# Patient Record
Sex: FEMALE | Race: WHITE | NOT HISPANIC OR LATINO | Employment: OTHER | URBAN - NONMETROPOLITAN AREA
[De-identification: names, ages, dates, MRNs, and addresses within clinical notes are randomized per-mention and may not be internally consistent; named-entity substitution may affect disease eponyms.]

---

## 2017-01-18 ENCOUNTER — TELEPHONE (OUTPATIENT)
Dept: CARDIOLOGY | Facility: CLINIC | Age: 77
End: 2017-01-18

## 2017-01-18 NOTE — TELEPHONE ENCOUNTER
Pt called stating that she feels like there is a vein close to the skin above the pacemaker.  She was concerned about it.  After speaking with the pt it sounds like a lead that she is feeling. Pt states nothing is out of the skin, she can just feel it under there.  I advised pt we could take a look at it if she is concerned and that we would need to see her ASAP if anything came through the skin there.   Advised that if it was very uncomfortable she should see her doctor.  Pt voiced understanding.

## 2017-01-23 RX ORDER — DIGOXIN 250 MCG
250 TABLET ORAL DAILY
Qty: 90 TABLET | Refills: 3 | Status: SHIPPED | OUTPATIENT
Start: 2017-01-23 | End: 2018-01-22 | Stop reason: SDUPTHER

## 2017-01-23 RX ORDER — OLMESARTAN MEDOXOMIL 40 MG/1
40 TABLET ORAL DAILY
Qty: 90 TABLET | Refills: 1 | Status: SHIPPED | OUTPATIENT
Start: 2017-01-23 | End: 2017-07-18 | Stop reason: SDUPTHER

## 2017-02-03 ENCOUNTER — CLINICAL SUPPORT (OUTPATIENT)
Dept: CARDIOLOGY | Facility: CLINIC | Age: 77
End: 2017-02-03

## 2017-02-03 DIAGNOSIS — Z95.810 AICD (AUTOMATIC CARDIOVERTER/DEFIBRILLATOR) PRESENT: Primary | ICD-10-CM

## 2017-02-03 DIAGNOSIS — I50.22 CHRONIC SYSTOLIC CONGESTIVE HEART FAILURE (HCC): ICD-10-CM

## 2017-02-03 DIAGNOSIS — I25.5 ISCHEMIC CARDIOMYOPATHY: ICD-10-CM

## 2017-02-03 PROCEDURE — 93297 REM INTERROG DEV EVAL ICPMS: CPT | Performed by: INTERNAL MEDICINE

## 2017-02-03 PROCEDURE — 93296 REM INTERROG EVL PM/IDS: CPT | Performed by: INTERNAL MEDICINE

## 2017-02-03 PROCEDURE — 93295 DEV INTERROG REMOTE 1/2/MLT: CPT | Performed by: INTERNAL MEDICINE

## 2017-02-03 NOTE — PROGRESS NOTES
Dual Chamber AICD Evaluation Report  [facility]    February 3, 2017    Primary Cardiologist: Hiram  : Medtronic Model: Evera  Implant date: May 28, 2015    Reason for evaluation:routine  Indication for AICD: ischemic cardiomyopathy, chronic systolic CHF    Measurements  Atrial sensing - P wave: 0.3 mV  Atrial threshold: n/a  Atrial lead impedance: 456 ohms  Ventricular sensing - R wave: 4.1 mV  Ventricular threshold: 1.125 V @ 0.4 ms  Ventricular lead impedance:  760 ohms  Shock impedance:  RV- 44 ohms   SVC- 58 ohms      Diagnostic Data  Atrial paced: n/a   Ventricular paced: 33.6 %  Other: chronic a-fib   Pt can not take anticoagulants per previous OV   No therapy delivered   Optivol WNL  Battery status: satisfactory  Est 7.9 years      Final Parameters  Mode: VVIR  Lower rate: 60 bpm   Atrial - Amplitude: n/a Sensitivity: 0.15 mV   Ventricular - Amplitude: 2.5 V Pulse width: 0.4 ms Sensitivity: 0.3 mV   Changes made: n/a  Conclusions: normal AICD function, no therapy delivered and stable pacing and sensing thresholds    Follow up: 3 months

## 2017-02-20 NOTE — TELEPHONE ENCOUNTER
Pt needs refills of crestor sent to [facility] for a 90 day supply.  She has appt in June. Kevyn Ralph, CMA

## 2017-02-22 RX ORDER — ROSUVASTATIN CALCIUM 10 MG/1
10 TABLET, FILM COATED ORAL DAILY
Qty: 90 TABLET | Refills: 3 | Status: SHIPPED | OUTPATIENT
Start: 2017-02-22 | End: 2018-04-26

## 2017-04-07 ENCOUNTER — OFFICE VISIT (OUTPATIENT)
Dept: CARDIOLOGY | Facility: CLINIC | Age: 77
End: 2017-04-07

## 2017-04-07 VITALS
SYSTOLIC BLOOD PRESSURE: 168 MMHG | DIASTOLIC BLOOD PRESSURE: 86 MMHG | WEIGHT: 225 LBS | HEIGHT: 63 IN | HEART RATE: 94 BPM | BODY MASS INDEX: 39.87 KG/M2

## 2017-04-07 DIAGNOSIS — I25.709 CORONARY ARTERY DISEASE INVOLVING CORONARY BYPASS GRAFT OF NATIVE HEART WITH ANGINA PECTORIS (HCC): ICD-10-CM

## 2017-04-07 DIAGNOSIS — I48.20 CHRONIC ATRIAL FIBRILLATION (HCC): ICD-10-CM

## 2017-04-07 DIAGNOSIS — I50.22 CHRONIC SYSTOLIC CONGESTIVE HEART FAILURE (HCC): Primary | ICD-10-CM

## 2017-04-07 DIAGNOSIS — E78.2 MIXED HYPERLIPIDEMIA: ICD-10-CM

## 2017-04-07 PROCEDURE — 93000 ELECTROCARDIOGRAM COMPLETE: CPT | Performed by: INTERNAL MEDICINE

## 2017-04-07 PROCEDURE — 99215 OFFICE O/P EST HI 40 MIN: CPT | Performed by: INTERNAL MEDICINE

## 2017-04-07 RX ORDER — METOPROLOL SUCCINATE 25 MG/1
25 TABLET, EXTENDED RELEASE ORAL DAILY
COMMUNITY
End: 2018-04-26 | Stop reason: ALTCHOICE

## 2017-04-07 RX ORDER — GUAIFENESIN 600 MG/1
1200 TABLET, EXTENDED RELEASE ORAL DAILY
COMMUNITY

## 2017-04-07 RX ORDER — FUROSEMIDE 40 MG/1
40 TABLET ORAL 2 TIMES DAILY
COMMUNITY

## 2017-04-07 RX ORDER — PROBENECID AND COLCHICINE 500; .5 MG/1; MG/1
1 TABLET ORAL 2 TIMES DAILY
COMMUNITY
End: 2018-09-11 | Stop reason: ALTCHOICE

## 2017-04-07 RX ORDER — SIMETHICONE 125 MG
125 TABLET,CHEWABLE ORAL EVERY 6 HOURS PRN
COMMUNITY
End: 2018-04-26 | Stop reason: ALTCHOICE

## 2017-04-07 NOTE — PROGRESS NOTES
Referring Provider: Nathen Garza MD    Reason for Follow-up Visit: CAD    Subjective .   Chief Complaint:   Chief Complaint   Patient presents with   • Follow-up     having chest pain and palpitations   • Chest Pain     pressure and tightness in chest.  feels like its coming from difibrillator   • Atrial Fibrillation     feeling heart skipping, racing and fluttering.   • Pacemaker Check     feels like it moves around and pokes out       History of present illness:  Cary Shah is a 76 y.o. yo female with history of CAD/CHF, s/p AICD. Complains of neck and should pain. Not particulary associated with exertion.        History  Past Medical History:   Diagnosis Date   • AICD present, double chamber    • Arteriosclerosis of coronary artery bypass graft     stents and CABG   • Atherosclerosis of native coronary artery with angina pectoris    • Back pain, chronic    • Cardiac device in situ     Cardiac device in situ (OPTIVOL REMOTE)   • Cardiac device in situ     Cardiac device in situ, other   • Chest pain    • Chronic atrial fibrillation    • Chronic kidney disease due to diabetes mellitus    • Chronic systolic heart failure     EF 45-50% 11/2014 echo   • Diabetes mellitus due to underlying condition with complications    • Diabetes mellitus type 2, uncontrolled, without complications    • H/O acute myocardial infarction    • History of coronary artery bypass graft     Coronary Artery Bypass, Two   • Hyperlipidemia    • Hypothyroidism    • Ischemic cardiomyopathy    • Malignant hypertension     better control   • Myocardial infarction    • Non-compliance    • Shortness of breath    • Shoulder pain, left    • Sleep apnea     not officially diagnosed   • Snoring    • Ventricular fibrillation    ,   Past Surgical History:   Procedure Laterality Date   • CARDIAC CATHETERIZATION     • CARDIAC DEFIBRILLATOR PLACEMENT     • CATARACT EXTRACTION     • CORONARY ARTERY BYPASS GRAFT     • CORONARY STENT PLACEMENT     •  INSERT / REPLACE / REMOVE PACEMAKER     • OTHER SURGICAL HISTORY      sweat gland removed   • OTHER SURGICAL HISTORY      reconstructive surgery after a spider bite   • TOTAL THYROIDECTOMY     • TUBAL ABDOMINAL LIGATION     ,   Family History   Problem Relation Age of Onset   • Coronary artery disease Mother    • Heart attack Mother    • Coronary artery disease Father    • Heart attack Father    • Heart disease Sister    • Heart attack Sister    • Coronary artery disease Sister    • Heart attack Brother    • Heart disease Brother    • Coronary artery disease Brother    • No Known Problems Maternal Grandmother    • No Known Problems Maternal Grandfather    • No Known Problems Paternal Grandmother    • No Known Problems Paternal Grandfather    • Heart disease Brother    • Heart disease Brother    • Heart attack Brother    ,   Social History   Substance Use Topics   • Smoking status: Current Some Day Smoker     Packs/day: 0.25     Types: Cigarettes     Start date: 1960   • Smokeless tobacco: Never Used      Comment: has started smoking again   • Alcohol use No   ,     Medications  Current Outpatient Prescriptions   Medication Sig Dispense Refill   • aspirin 81 MG EC tablet Take 81 mg by mouth Daily.     • bismuth subsalicylate (PEPTO BISMOL) 262 MG/15ML suspension Take 15 mL by mouth Every 6 (Six) Hours As Needed for indigestion.     • Camphor-Eucalyptus-Menthol (VICKS VAPORUB EX) Apply  topically.     • colchicine-probenecid (COL-BENEMID) 0.5-500 MG tablet Take 1 tablet by mouth Daily.     • CRESTOR 10 MG tablet Take 1 tablet by mouth Daily. 90 tablet 3   • digoxin (LANOXIN) 250 MCG tablet Take 1 tablet by mouth Daily. 90 tablet 3   • felodipine (PLENDIL) 5 MG 24 hr tablet Take 5 mg by mouth Daily.     • furosemide (LASIX) 40 MG tablet Take 40 mg by mouth 2 (Two) Times a Day.     • guaiFENesin (MUCINEX) 600 MG 12 hr tablet Take 1,200 mg by mouth 2 (Two) Times a Day.     • HYDROcodone-acetaminophen (NORCO) 7.5-325 MG  "per tablet Take 1 tablet by mouth Every 6 (Six) Hours As Needed for Moderate Pain (4-6).     • insulin aspart (novoLOG) 100 UNIT/ML injection Inject  under the skin 3 (Three) Times a Day Before Meals.     • insulin glargine (LANTUS) 100 UNIT/ML injection Inject  under the skin Daily.     • levothyroxine (SYNTHROID, LEVOTHROID) 175 MCG tablet Take 175 mcg by mouth Daily.     • olmesartan (BENICAR) 40 MG tablet Take 1 tablet by mouth Daily. 90 tablet 1   • pantoprazole (PROTONIX) 40 MG EC tablet Take 40 mg by mouth Daily.     • potassium chloride (K-DUR,KLOR-CON) 20 MEQ CR tablet Take 20 mEq by mouth 2 (Two) Times a Day.     • simethicone (MYLICON) 125 MG chewable tablet Chew 125 mg Every 6 (Six) Hours As Needed for flatulence.     • vitamin E 400 UNIT capsule Take 400 Units by mouth Daily.     • carvedilol (COREG) 3.125 MG tablet Take 3.125 mg by mouth 2 (Two) Times a Day With Meals.     • methocarbamol (ROBAXIN) 500 MG tablet Take 500 mg by mouth 4 (Four) Times a Day.     • metoprolol succinate XL (TOPROL-XL) 25 MG 24 hr tablet Take 25 mg by mouth Daily.       No current facility-administered medications for this visit.        Allergies:  Levaquin [levofloxacin]    Review of Systems  Review of Systems   HENT: Negative for nosebleeds.    Cardiovascular: Positive for chest pain and dyspnea on exertion. Negative for claudication, irregular heartbeat, leg swelling, near-syncope, orthopnea, palpitations, paroxysmal nocturnal dyspnea and syncope.   Respiratory: Negative for cough, hemoptysis and shortness of breath.    Musculoskeletal: Positive for joint pain and neck pain.   Gastrointestinal: Negative for dysphagia, hematemesis and melena.   Genitourinary: Negative for hematuria.       Objective     Physical Exam:  /86 (BP Location: Left arm, Patient Position: Sitting, Cuff Size: Large Adult)  Pulse 94  Ht 63\" (160 cm)  Wt 225 lb (102 kg)  BMI 39.86 kg/m2  Physical Exam   Constitutional: She is oriented to " person, place, and time. She appears well-developed and well-nourished. No distress.   HENT:   Head: Normocephalic and atraumatic.   Eyes: No scleral icterus.   Neck: Normal range of motion.   Cardiovascular: Normal rate, regular rhythm and normal heart sounds.  Exam reveals no gallop and no friction rub.    No murmur heard.  Pulmonary/Chest: Effort normal and breath sounds normal. No respiratory distress. She has no wheezes. She has no rales.   Abdominal: Soft. Bowel sounds are normal. She exhibits no distension. There is no tenderness.   Musculoskeletal: She exhibits no edema.   Neurological: She is alert and oriented to person, place, and time. No cranial nerve deficit.   Skin: Skin is warm and dry. She is not diaphoretic. No erythema.   Psychiatric: She has a normal mood and affect. Her behavior is normal.       Results Review:    ECG 12 Lead  Date/Time: 4/7/2017 10:02 AM  Performed by: LUDWIN FITZPATRICK  Authorized by: LUDWIN FITZPATRICK   Comparison: compared with previous ECG   Similar to previous ECG  Rhythm: atrial fibrillation  Ectopy: infrequent PVCs  Rate: normal  ST Segments: ST segments normal  T depression: all  QRS axis: normal  Clinical impression: abnormal ECG  Comments: Occasional pacing             Hospital Outpatient Visit on 05/20/2015   Component Date Value Ref Range Status   • WBC 05/20/2015 5.77  4.80 - 10.80 K/mcL Final   • RBC 05/20/2015 4.44  4.20 - 5.40 M/mcL Final   • Hemoglobin 05/20/2015 13.6  12.0 - 16.0 g/dL Final   • Hematocrit 05/20/2015 40.2  37.0 - 47.0 % Final   • MCV 05/20/2015 90.5  82.0 - 98.0 fL Final   • MCH 05/20/2015 30.6  28.0 - 32.0 pg Final   • MCHC 05/20/2015 33.8  33.0 - 36.0 gm/dL Final   • RDW-SD 05/20/2015 41.7  40.0 - 54.0 fL Final   • RDW-CV 05/20/2015 12.5  12.0 - 15.0 % Final   • RDW 05/20/2015 12.5  12 - 15 % Final   • Platelets 05/20/2015 175  130 - 400 K/mcL Final   • Sodium 05/20/2015 142  135 - 145 mmol/L Final   • Potassium 05/20/2015 4.1  3.5 - 5.3 mmol/L Final    • Chloride 05/20/2015 101  98 - 110 mmol/L Final   • CO2 05/20/2015 28  24 - 31 mmol/L Final   • Glucose 05/20/2015 109* 70 - 100 mg/dL Final   • BUN 05/20/2015 27* 5 - 21 mg/dL Final   • Creatinine 05/20/2015 1.02  0.5 - 1.4 mg/dL Final   • Calcium 05/20/2015 9.7  8.4 - 10.4 mg/dL Final   • Total Protein 05/20/2015 7.6  6.3 - 8.7 g/dL Final   • Albumin 05/20/2015 4.5  3.5 - 5.0 g/dL Final   • Total Bilirubin 05/20/2015 0.6  0.1 - 1.0 mg/dL Final   • Alkaline Phosphatase 05/20/2015 80  24 - 120 Units/L Final   • AST (SGOT) 05/20/2015 29  7 - 45 Units/L Final   • ALT (SGPT) 05/20/2015 31  0 - 54 Units/L Final   • Anion Gap 05/20/2015 14* 4 - 13 mmol/L Final   • eGFR 05/20/2015 53  ml/min/1.732 Final    Comment: DF by IF @ 05/20/2015 19:33  GFR Normal                            >60  Chronic Kidney Disease          <60  Kidney Failure                         <15  US by IF @ 05/20/2015 19:33  The MDRD GFR formula is only valid for adults with stable renal function between ages 18 and 70.     • Total Cholesterol 05/20/2015 223* 130 - 200 mg/dL Final   • HDL Cholesterol 05/20/2015 32  mg/dL Final    Comment: DF by IF @ 05/20/2015 19:33  HDL Reference Range  Female: >50  Male: >40     • Triglycerides 05/20/2015 398* 0 - 149 mg/dL Final   • VLDL Cholesterol 05/20/2015 80* 6 - 32 mg/dL Final   • LDL Cholesterol  05/20/2015 115* 0 - 99 mg/dL Final   • LDL/HDL Ratio 05/20/2015 3.6* 0.0 - 3.4 Final   • Fasting? 05/20/2015 y   Final       Assessment/Plan   Cary was seen today for follow-up, chest pain, atrial fibrillation and pacemaker check.    Diagnoses and all orders for this visit:    Chronic systolic congestive heart failure, Currently doing well with compensated chronic systolic congestive heart failure    Coronary artery disease involving coronary bypass graft of native heart with angina pectoris, pain is very atypical. Has not had a stress test since 2014. Will schedule    Mixed hyperlipidemia Patient's statin  therapy is followed by PCP.    Chronic atrial fibrillation, chronic, anticoagulated, rate controlled  Hypertension, running high. Agree with restarting her beta blocker    Other orders  -     ECG 12 Lead

## 2017-04-10 ENCOUNTER — HOSPITAL ENCOUNTER (OUTPATIENT)
Dept: CARDIOLOGY | Facility: HOSPITAL | Age: 77
Discharge: HOME | End: 2017-04-10
Attending: INTERNAL MEDICINE | Admitting: INTERNAL MEDICINE

## 2017-04-10 VITALS — SYSTOLIC BLOOD PRESSURE: 147 MMHG | HEART RATE: 80 BPM | DIASTOLIC BLOOD PRESSURE: 70 MMHG

## 2017-04-10 DIAGNOSIS — I50.22 CHRONIC SYSTOLIC CONGESTIVE HEART FAILURE (HCC): ICD-10-CM

## 2017-04-10 PROCEDURE — C8928 TTE W OR W/O FOL W/CON,STRES: HCPCS

## 2017-04-10 PROCEDURE — 93352 ADMIN ECG CONTRAST AGENT: CPT | Performed by: INTERNAL MEDICINE

## 2017-04-10 PROCEDURE — 93018 CV STRESS TEST I&R ONLY: CPT | Performed by: INTERNAL MEDICINE

## 2017-04-10 PROCEDURE — 93350 STRESS TTE ONLY: CPT | Performed by: INTERNAL MEDICINE

## 2017-04-10 PROCEDURE — 25010000003 DOBUTAMINE PER 250 MG: Performed by: INTERNAL MEDICINE

## 2017-04-10 PROCEDURE — 93017 CV STRESS TEST TRACING ONLY: CPT

## 2017-04-10 PROCEDURE — 25010000002 PERFLUTREN (DEFINITY) 8.476 MG IN SODIUM CHLORIDE 10 ML INJECTION: Performed by: INTERNAL MEDICINE

## 2017-04-10 RX ORDER — DOBUTAMINE HYDROCHLORIDE 100 MG/100ML
10-50 INJECTION INTRAVENOUS
Status: DISCONTINUED | OUTPATIENT
Start: 2017-04-10 | End: 2017-04-11 | Stop reason: HOSPADM

## 2017-04-10 RX ADMIN — DOBUTAMINE HYDROCHLORIDE 10 MCG/KG/MIN: 100 INJECTION INTRAVENOUS at 15:20

## 2017-04-10 RX ADMIN — SODIUM CHLORIDE 5 ML: 9 INJECTION INTRAMUSCULAR; INTRAVENOUS; SUBCUTANEOUS at 15:07

## 2017-04-10 RX ADMIN — SODIUM CHLORIDE 5 ML: 9 INJECTION INTRAMUSCULAR; INTRAVENOUS; SUBCUTANEOUS at 15:24

## 2017-04-11 LAB
BH CV ECHO MEAS - BSA(HAYCOCK): 2.2 M^2
BH CV ECHO MEAS - BSA: 2 M^2
BH CV ECHO MEAS - BZI_BMI: 39.9 KILOGRAMS/M^2
BH CV ECHO MEAS - BZI_METRIC_HEIGHT: 160 CM
BH CV ECHO MEAS - BZI_METRIC_WEIGHT: 102.1 KG
BH CV STRESS BP STAGE 1: NORMAL
BH CV STRESS BP STAGE 3: NORMAL
BH CV STRESS DOSE DOBUTAMINE STAGE 1: 10
BH CV STRESS DOSE DOBUTAMINE STAGE 2: 20
BH CV STRESS DOSE DOBUTAMINE STAGE 3: 30
BH CV STRESS DURATION MIN STAGE 1: 3
BH CV STRESS DURATION MIN STAGE 2: 3
BH CV STRESS DURATION MIN STAGE 3: 2
BH CV STRESS DURATION SEC STAGE 1: 0
BH CV STRESS DURATION SEC STAGE 2: 0
BH CV STRESS DURATION SEC STAGE 3: 13
BH CV STRESS HR STAGE 1: 105
BH CV STRESS HR STAGE 2: 96
BH CV STRESS HR STAGE 3: 135
BH CV STRESS PROTOCOL 1: NORMAL
BH CV STRESS RECOVERY BP: NORMAL MMHG
BH CV STRESS RECOVERY HR: 73 BPM
BH CV STRESS STAGE 1: 1
BH CV STRESS STAGE 2: 2
BH CV STRESS STAGE 3: 3
MAXIMAL PREDICTED HEART RATE: 145 BPM
PERCENT MAX PREDICTED HR: 93.1 %
STRESS BASELINE BP: NORMAL MMHG
STRESS BASELINE HR: 80 BPM
STRESS PERCENT HR: 110 %
STRESS POST PEAK BP: NORMAL MMHG
STRESS POST PEAK HR: 135 BPM
STRESS TARGET HR: 123 BPM

## 2017-04-19 ENCOUNTER — TELEPHONE (OUTPATIENT)
Dept: CARDIOLOGY | Facility: CLINIC | Age: 77
End: 2017-04-19

## 2017-04-19 NOTE — TELEPHONE ENCOUNTER
----- Message from Gus Gandhi MD sent at 4/11/2017 12:15 PM CDT -----  Negative for ischemia  4/19/17 pt informed.  Mailed letter with results.  Kevyn Ralph, CMA

## 2017-05-05 ENCOUNTER — CLINICAL SUPPORT (OUTPATIENT)
Dept: CARDIOLOGY | Facility: CLINIC | Age: 77
End: 2017-05-05

## 2017-05-05 DIAGNOSIS — Z95.810 AICD (AUTOMATIC CARDIOVERTER/DEFIBRILLATOR) PRESENT: Primary | ICD-10-CM

## 2017-05-05 DIAGNOSIS — I50.22 CHRONIC SYSTOLIC CONGESTIVE HEART FAILURE (HCC): ICD-10-CM

## 2017-05-05 PROCEDURE — 93297 REM INTERROG DEV EVAL ICPMS: CPT | Performed by: INTERNAL MEDICINE

## 2017-05-05 PROCEDURE — 93296 REM INTERROG EVL PM/IDS: CPT | Performed by: INTERNAL MEDICINE

## 2017-05-05 PROCEDURE — 93295 DEV INTERROG REMOTE 1/2/MLT: CPT | Performed by: INTERNAL MEDICINE

## 2017-07-19 RX ORDER — OLMESARTAN MEDOXOMIL 40 MG/1
TABLET ORAL
Qty: 90 TABLET | Refills: 3 | Status: SHIPPED | OUTPATIENT
Start: 2017-07-19 | End: 2018-07-17 | Stop reason: SDUPTHER

## 2017-08-07 ENCOUNTER — CLINICAL SUPPORT (OUTPATIENT)
Dept: CARDIOLOGY | Facility: CLINIC | Age: 77
End: 2017-08-07

## 2017-08-07 DIAGNOSIS — I48.20 CHRONIC ATRIAL FIBRILLATION (HCC): ICD-10-CM

## 2017-08-07 DIAGNOSIS — I50.22 CHRONIC SYSTOLIC CONGESTIVE HEART FAILURE (HCC): ICD-10-CM

## 2017-08-07 DIAGNOSIS — Z95.810 AICD (AUTOMATIC CARDIOVERTER/DEFIBRILLATOR) PRESENT: Primary | ICD-10-CM

## 2017-08-07 PROCEDURE — 93295 DEV INTERROG REMOTE 1/2/MLT: CPT | Performed by: INTERNAL MEDICINE

## 2017-08-07 PROCEDURE — 93296 REM INTERROG EVL PM/IDS: CPT | Performed by: INTERNAL MEDICINE

## 2017-08-07 PROCEDURE — 93297 REM INTERROG DEV EVAL ICPMS: CPT | Performed by: INTERNAL MEDICINE

## 2017-11-10 ENCOUNTER — CLINICAL SUPPORT (OUTPATIENT)
Dept: CARDIOLOGY | Facility: CLINIC | Age: 77
End: 2017-11-10

## 2017-11-10 DIAGNOSIS — I50.22 CHRONIC SYSTOLIC CONGESTIVE HEART FAILURE (HCC): ICD-10-CM

## 2017-11-10 PROCEDURE — 93295 DEV INTERROG REMOTE 1/2/MLT: CPT | Performed by: PHYSICIAN ASSISTANT

## 2017-11-10 PROCEDURE — 93296 REM INTERROG EVL PM/IDS: CPT | Performed by: PHYSICIAN ASSISTANT

## 2017-11-13 NOTE — PROGRESS NOTES
Dual Chamber AICD Evaluation Report  [facility]    November 13, 2017    Primary Cardiologist: Hiram  : Medtronic Model: Evera  Implant date: 5/28/15    Reason for evaluation: routine  Indication for AICD: chronic systolic congestive heart failure and ischemic cardiomyopathy    Measurements  Atrial sensing - P wave: 0.1 mV  Atrial threshold: n/r  Atrial lead impedance: 399 ohms  Ventricular sensing - R wave: 4.6 mV  Ventricular threshold: 1.25 V @ 0.4 ms  Ventricular lead impedance:  722 ohms  Shock impedance:  RV- 42 ohms   SVC- 56 ohms      Diagnostic Data  Atrial paced: 0 %  Ventricular paced: 42 %  Other: Patient appears to be in chronic AF.  Rates controlled.  Patient is on ASA and cannot take anticoagulation as documented on previous device check notes.  Battery status: satisfactory        Final Parameters  Mode: VVIR  Lower rate: 60 bpm Upper rate: 130 bpm  Atrial - Amplitude: n/r Sensitivity: 0.15 mV   Ventricular - Amplitude: 2.5 V Pulse width: 0.4 ms Sensitivity: 0.3 mV   Changes made: n/a  Conclusions: normal AICD function    Follow up: 3 months

## 2018-01-24 RX ORDER — DIGOXIN 250 MCG
250 TABLET ORAL DAILY
Qty: 90 TABLET | Refills: 3 | Status: SHIPPED | OUTPATIENT
Start: 2018-01-24 | End: 2018-04-26 | Stop reason: SDUPTHER

## 2018-01-24 RX ORDER — DIGOXIN 250 UG/1
TABLET ORAL
Qty: 90 TABLET | Refills: 0 | Status: SHIPPED | OUTPATIENT
Start: 2018-01-24 | End: 2019-01-15 | Stop reason: SDUPTHER

## 2018-02-12 ENCOUNTER — CLINICAL SUPPORT NO REQUIREMENTS (OUTPATIENT)
Dept: CARDIOLOGY | Facility: CLINIC | Age: 78
End: 2018-02-12

## 2018-02-12 DIAGNOSIS — I50.22 CHRONIC SYSTOLIC CONGESTIVE HEART FAILURE (HCC): ICD-10-CM

## 2018-02-12 PROCEDURE — 93296 REM INTERROG EVL PM/IDS: CPT | Performed by: PHYSICIAN ASSISTANT

## 2018-02-12 PROCEDURE — 93295 DEV INTERROG REMOTE 1/2/MLT: CPT | Performed by: PHYSICIAN ASSISTANT

## 2018-02-14 NOTE — PROGRESS NOTES
Dual Chamber AICD Evaluation Report  [facility]    February 14, 2018    Primary Cardiologist: Hiram  : Medtronic Model: Evera  Implant date: 5/28/15    Reason for evaluation: routine  Indication for AICD: chronic systolic heart failure and ischemic cardiomyopathy    Measurements  Atrial sensing - P wave: 0.1 mV  Atrial threshold: n/r  Atrial lead impedance: 456 ohms  Ventricular sensing - R wave: 4.9 mV  Ventricular threshold: 1.125 V @ 0.4 ms  Ventricular lead impedance:  817 ohms  Shock impedance:  RV- 45 ohms   SVC- 64 ohms      Diagnostic Data  Atrial paced: 34.6 %  Ventricular paced: 40.6 %  Other: 100% AF; Cannot take anticoagulant  Battery status: satisfactory        Final Parameters  Mode: VVIR  Lower rate: 60 bpm Upper rate: 130 bpm     Atrial - Sensitivity: 0.15 mV   Ventricular - Amplitude: 2.25 V Pulse width: 0.4 ms Sensitivity: 0.3 mV   Changes made: n/a  Conclusions: normal AICD function    Follow up: 3 months

## 2018-04-26 ENCOUNTER — OFFICE VISIT (OUTPATIENT)
Dept: CARDIOLOGY | Facility: CLINIC | Age: 78
End: 2018-04-26

## 2018-04-26 VITALS
HEART RATE: 94 BPM | RESPIRATION RATE: 18 BRPM | SYSTOLIC BLOOD PRESSURE: 152 MMHG | WEIGHT: 230 LBS | BODY MASS INDEX: 40.75 KG/M2 | DIASTOLIC BLOOD PRESSURE: 85 MMHG | HEIGHT: 63 IN

## 2018-04-26 DIAGNOSIS — Z87.42 HISTORY OF VAGINAL BLEEDING: ICD-10-CM

## 2018-04-26 DIAGNOSIS — Z95.5 STATUS POST CORONARY ARTERY STENT PLACEMENT: ICD-10-CM

## 2018-04-26 DIAGNOSIS — I25.709 CORONARY ARTERY DISEASE INVOLVING CORONARY BYPASS GRAFT OF NATIVE HEART WITH ANGINA PECTORIS (HCC): Primary | ICD-10-CM

## 2018-04-26 DIAGNOSIS — I50.22 CHRONIC SYSTOLIC CONGESTIVE HEART FAILURE (HCC): ICD-10-CM

## 2018-04-26 DIAGNOSIS — I10 ESSENTIAL HYPERTENSION: ICD-10-CM

## 2018-04-26 DIAGNOSIS — IMO0001 CLASS 3 OBESITY DUE TO EXCESS CALORIES WITH SERIOUS COMORBIDITY AND BODY MASS INDEX (BMI) OF 40.0 TO 44.9 IN ADULT: ICD-10-CM

## 2018-04-26 DIAGNOSIS — E78.2 MIXED HYPERLIPIDEMIA: ICD-10-CM

## 2018-04-26 DIAGNOSIS — Z79.4 TYPE 2 DIABETES MELLITUS WITH COMPLICATION, WITH LONG-TERM CURRENT USE OF INSULIN (HCC): ICD-10-CM

## 2018-04-26 DIAGNOSIS — Z95.1 S/P CABG X 2: ICD-10-CM

## 2018-04-26 DIAGNOSIS — E11.8 TYPE 2 DIABETES MELLITUS WITH COMPLICATION, WITH LONG-TERM CURRENT USE OF INSULIN (HCC): ICD-10-CM

## 2018-04-26 DIAGNOSIS — I48.20 CHRONIC ATRIAL FIBRILLATION (HCC): ICD-10-CM

## 2018-04-26 PROCEDURE — 93000 ELECTROCARDIOGRAM COMPLETE: CPT | Performed by: NURSE PRACTITIONER

## 2018-04-26 PROCEDURE — 99214 OFFICE O/P EST MOD 30 MIN: CPT | Performed by: NURSE PRACTITIONER

## 2018-04-26 RX ORDER — METOPROLOL SUCCINATE 25 MG/1
25 TABLET, EXTENDED RELEASE ORAL DAILY
Qty: 90 TABLET | Refills: 3 | Status: SHIPPED | OUTPATIENT
Start: 2018-04-26 | End: 2019-04-15 | Stop reason: SDUPTHER

## 2018-04-26 RX ORDER — SPIRONOLACTONE 25 MG/1
25 TABLET ORAL DAILY
Qty: 90 TABLET | Refills: 3 | Status: SHIPPED | OUTPATIENT
Start: 2018-04-26 | End: 2019-04-15 | Stop reason: SDUPTHER

## 2018-04-26 RX ORDER — ASPIRIN 325 MG
325 TABLET, DELAYED RELEASE (ENTERIC COATED) ORAL DAILY
COMMUNITY

## 2018-04-26 NOTE — PATIENT INSTRUCTIONS
Obesity, Adult  Obesity is the condition of having too much total body fat. Being overweight or obese means that your weight is greater than what is considered healthy for your body size. Obesity is determined by a measurement called BMI. BMI is an estimate of body fat and is calculated from height and weight. For adults, a BMI of 30 or higher is considered obese.  Obesity can eventually lead to other health concerns and major illnesses, including:  · Stroke.  · Coronary artery disease (CAD).  · Type 2 diabetes.  · Some types of cancer, including cancers of the colon, breast, uterus, and gallbladder.  · Osteoarthritis.  · High blood pressure (hypertension).  · High cholesterol.  · Sleep apnea.  · Gallbladder stones.  · Infertility problems.  What are the causes?  The main cause of obesity is taking in (consuming) more calories than your body uses for energy. Other factors that contribute to this condition may include:  · Being born with genes that make you more likely to become obese.  · Having a medical condition that causes obesity. These conditions include:  ¨ Hypothyroidism.  ¨ Polycystic ovarian syndrome (PCOS).  ¨ Binge-eating disorder.  ¨ Cushing syndrome.  · Taking certain medicines, such as steroids, antidepressants, and seizure medicines.  · Not being physically active (sedentary lifestyle).  · Living where there are limited places to exercise safely or buy healthy foods.  · Not getting enough sleep.  What increases the risk?  The following factors may increase your risk of this condition:  · Having a family history of obesity.  · Being a woman of -American descent.  · Being a man of  descent.  What are the signs or symptoms?  Having excessive body fat is the main symptom of this condition.  How is this diagnosed?  This condition may be diagnosed based on:  · Your symptoms.  · Your medical history.  · A physical exam. Your health care provider may measure:  ¨ Your BMI. If you are an adult  with a BMI between 25 and less than 30, you are considered overweight. If you are an adult with a BMI of 30 or higher, you are considered obese.  ¨ The distances around your hips and your waist (circumferences). These may be compared to each other to help diagnose your condition.  ¨ Your skinfold thickness. Your health care provider may gently pinch a fold of your skin and measure it.  How is this treated?  Treatment for this condition often includes changing your lifestyle. Treatment may include some or all of the following:  · Dietary changes. Work with your health care provider and a dietitian to set a weight-loss goal that is healthy and reasonable for you. Dietary changes may include eating:  ¨ Smaller portions. A portion size is the amount of a particular food that is healthy for you to eat at one time. This varies from person to person.  ¨ Low-calorie or low-fat options.  ¨ More whole grains, fruits, and vegetables.  · Regular physical activity. This may include aerobic activity (cardio) and strength training.  · Medicine to help you lose weight. Your health care provider may prescribe medicine if you are unable to lose 1 pound a week after 6 weeks of eating more healthily and doing more physical activity.  · Surgery. Surgical options may include gastric banding and gastric bypass. Surgery may be done if:  ¨ Other treatments have not helped to improve your condition.  ¨ You have a BMI of 40 or higher.  ¨ You have life-threatening health problems related to obesity.  Follow these instructions at home:     Eating and drinking     · Follow recommendations from your health care provider about what you eat and drink. Your health care provider may advise you to:  ¨ Limit fast foods, sweets, and processed snack foods.  ¨ Choose low-fat options, such as low-fat milk instead of whole milk.  ¨ Eat 5 or more servings of fruits or vegetables every day.  ¨ Eat at home more often. This gives you more control over what you  eat.  ¨ Choose healthy foods when you eat out.  ¨ Learn what a healthy portion size is.  ¨ Keep low-fat snacks on hand.  ¨ Avoid sugary drinks, such as soda, fruit juice, iced tea sweetened with sugar, and flavored milk.  ¨ Eat a healthy breakfast.  · Drink enough water to keep your urine clear or pale yellow.  · Do not go without eating for long periods of time (do not fast) or follow a fad diet. Fasting and fad diets can be unhealthy and even dangerous.  Physical Activity   · Exercise regularly, as told by your health care provider. Ask your health care provider what types of exercise are safe for you and how often you should exercise.  · Warm up and stretch before being active.  · Cool down and stretch after being active.  · Rest between periods of activity.  Lifestyle   · Limit the time that you spend in front of your TV, computer, or video game system.  · Find ways to reward yourself that do not involve food.  · Limit alcohol intake to no more than 1 drink a day for nonpregnant women and 2 drinks a day for men. One drink equals 12 oz of beer, 5 oz of wine, or 1½ oz of hard liquor.  General instructions   · Keep a weight loss journal to keep track of the food you eat and how much you exercise you get.  · Take over-the-counter and prescription medicines only as told by your health care provider.  · Take vitamins and supplements only as told by your health care provider.  · Consider joining a support group. Your health care provider may be able to recommend a support group.  · Keep all follow-up visits as told by your health care provider. This is important.  Contact a health care provider if:  · You are unable to meet your weight loss goal after 6 weeks of dietary and lifestyle changes.  This information is not intended to replace advice given to you by your health care provider. Make sure you discuss any questions you have with your health care provider.  Document Released: 01/25/2006 Document Revised:  "05/22/2017 Document Reviewed: 10/05/2016  [employer] Interactive Patient Education © 2017 [employer].    Heart-Healthy Eating Plan  Heart-healthy meal planning includes:  · Limiting unhealthy fats.  · Increasing healthy fats.  · Making other small dietary changes.  You may need to talk with your doctor or a diet specialist (dietitian) to create an eating plan that is right for you.  What types of fat should I choose?  · Choose healthy fats. These include olive oil and canola oil, flaxseeds, walnuts, almonds, and seeds.  · Eat more omega-3 fats. These include salmon, mackerel, sardines, tuna, flaxseed oil, and ground flaxseeds. Try to eat fish at least twice each week.  · Limit saturated fats.  ¨ Saturated fats are often found in animal products, such as meats, butter, and cream.  ¨ Plant sources of saturated fats include palm oil, palm kernel oil, and coconut oil.  · Avoid foods with partially hydrogenated oils in them. These include stick margarine, some tub margarines, cookies, crackers, and other baked goods. These contain trans fats.  What general guidelines do I need to follow?  · Check food labels carefully. Identify foods with trans fats or high amounts of saturated fat.  · Fill one half of your plate with vegetables and green salads. Eat 4-5 servings of vegetables per day. A serving of vegetables is:  ¨ 1 cup of raw leafy vegetables.  ¨ ½ cup of raw or cooked cut-up vegetables.  ¨ ½ cup of vegetable juice.  · Fill one fourth of your plate with whole grains. Look for the word \"whole\" as the first word in the ingredient list.  · Fill one fourth of your plate with lean protein foods.  · Eat 4-5 servings of fruit per day. A serving of fruit is:  ¨ One medium whole fruit.  ¨ ¼ cup of dried fruit.  ¨ ½ cup of fresh, frozen, or canned fruit.  ¨ ½ cup of 100% fruit juice.  · Eat more foods that contain soluble fiber. These include apples, broccoli, carrots, beans, peas, and barley. Try to get 20-30 g of fiber per " day.  · Eat more home-cooked food. Eat less restaurant, buffet, and fast food.  · Limit or avoid alcohol.  · Limit foods high in starch and sugar.  · Avoid fried foods.  · Avoid frying your food. Try baking, boiling, grilling, or broiling it instead. You can also reduce fat by:  ¨ Removing the skin from poultry.  ¨ Removing all visible fats from meats.  ¨ Skimming the fat off of stews, soups, and gravies before serving them.  ¨ Steaming vegetables in water or broth.  · Lose weight if you are overweight.  · Eat 4-5 servings of nuts, legumes, and seeds per week:  ¨ One serving of dried beans or legumes equals ½ cup after being cooked.  ¨ One serving of nuts equals 1½ ounces.  ¨ One serving of seeds equals ½ ounce or one tablespoon.  · You may need to keep track of how much salt or sodium you eat. This is especially true if you have high blood pressure. Talk with your doctor or dietitian to get more information.  What foods can I eat?  Grains   Breads, including [place]er, white, hannah, wheat, raisin, rye, oatmeal, and Italian. Tortillas that are neither fried nor made with lard or trans fat. Low-fat rolls, including hotdog and hamburger buns and English muffins. Biscuits. Muffins. Waffles. Pancakes. Light popcorn. Whole-grain cereals. Flatbread. Patricia toast. Pretzels. Breadsticks. Rusks. Low-fat snacks. Low-fat crackers, including oyster, saltine, matzo, marianna, animal, and rye. Rice and pasta, including brown rice and pastas that are made with whole wheat.  Vegetables   All vegetables.  Fruits   All fruits, but limit coconut.  Meats and Other Protein Sources   Lean, well-trimmed beef, veal, pork, and lamb. Chicken and turkey without skin. All fish and shellfish. Wild duck, rabbit, pheasant, and venison. Egg whites or low-cholesterol egg substitutes. Dried beans, peas, lentils, and tofu. Seeds and most nuts.  Dairy   Low-fat or nonfat cheeses, including ricotta, string, and mozzarella. Skim or 1% milk that is liquid,  powdered, or evaporated. Buttermilk that is made with low-fat milk. Nonfat or low-fat yogurt.  Beverages   Mineral water. Diet carbonated beverages.  Sweets and Desserts   Sherbets and fruit ices. Honey, jam, marmalade, jelly, and syrups. Meringues and gelatins. Pure sugar candy, such as hard candy, jelly beans, gumdrops, mints, marshmallows, and small amounts of dark chocolate. Peter food cake.  Eat all sweets and desserts in moderation.  Fats and Oils   Nonhydrogenated (trans-free) margarines. Vegetable oils, including soybean, sesame, sunflower, olive, peanut, safflower, corn, canola, and cottonseed. Salad dressings or mayonnaise made with a vegetable oil. Limit added fats and oils that you use for cooking, baking, salads, and as spreads.  Other   Cocoa powder. Coffee and tea. All seasonings and condiments.  The items listed above may not be a complete list of recommended foods or beverages. Contact your dietitian for more options.   What foods are not recommended?  Grains   Breads that are made with saturated or trans fats, oils, or whole milk. Croissants. Butter rolls. Cheese breads. Sweet rolls. Donuts. Buttered popcorn. Chow mein noodles. High-fat crackers, such as cheese or butter crackers.  Meats and Other Protein Sources   Fatty meats, such as hotdogs, short ribs, sausage, spareribs, oneal, rib eye roast or steak, and mutton. High-fat deli meats, such as salami and bologna. Caviar. Domestic duck and goose. Organ meats, such as kidney, liver, sweetbreads, and heart.  Dairy   Cream, sour cream, cream cheese, and creamed cottage cheese. Whole-milk cheeses, including blue (hari), [place], Brie, [place], American, Havarti, Swiss, cheddar, Camembert, and [place]. Whole or 2% milk that is liquid, evaporated, or condensed. Whole buttermilk. Cream sauce or high-fat cheese sauce. Yogurt that is made from whole milk.  Beverages   Regular sodas and juice drinks with added sugar.  Sweets and Desserts   Frosting.  Pudding. Cookies. Cakes other than sakina food cake. Candy that has milk chocolate or white chocolate, hydrogenated fat, butter, coconut, or unknown ingredients. Buttered syrups. Full-fat ice cream or ice cream drinks.  Fats and Oils   Gravy that has suet, meat fat, or shortening. Cocoa butter, hydrogenated oils, palm oil, coconut oil, palm kernel oil. These can often be found in baked products, candy, fried foods, nondairy creamers, and whipped toppings. Solid fats and shortenings, including oneal fat, salt pork, lard, and butter. Nondairy cream substitutes, such as coffee creamers and sour cream substitutes. Salad dressings that are made of unknown oils, cheese, or sour cream.  The items listed above may not be a complete list of foods and beverages to avoid. Contact your dietitian for more information.   This information is not intended to replace advice given to you by your health care provider. Make sure you discuss any questions you have with your health care provider.  Document Released: 06/18/2013 Document Revised: 05/25/2017 Document Reviewed: 06/11/2015  [employer] Interactive Patient Education © 2017 [employer]    Exercising to Lose Weight  Exercising can help you to lose weight. In order to lose weight through exercise, you need to do vigorous-intensity exercise. You can tell that you are exercising with vigorous intensity if you are breathing very hard and fast and cannot hold a conversation while exercising.  Moderate-intensity exercise helps to maintain your current weight. You can tell that you are exercising at a moderate level if you have a higher heart rate and faster breathing, but you are still able to hold a conversation.  How often should I exercise?  Choose an activity that you enjoy and set realistic goals. Your health care provider can help you to make an activity plan that works for you. Exercise regularly as directed by your health care provider. This may include:  · Doing resistance  training twice each week, such as:  ¨ Push-ups.  ¨ Sit-ups.  ¨ Lifting weights.  ¨ Using resistance bands.  · Doing a given intensity of exercise for a given amount of time. Choose from these options:  ¨ 150 minutes of moderate-intensity exercise every week.  ¨ 75 minutes of vigorous-intensity exercise every week.  ¨ A mix of moderate-intensity and vigorous-intensity exercise every week.  Children, pregnant women, people who are out of shape, people who are overweight, and older adults may need to consult a health care provider for individual recommendations. If you have any sort of medical condition, be sure to consult your health care provider before starting a new exercise program.  What are some activities that can help me to lose weight?  · Walking at a rate of at least 4.5 miles an hour.  · Jogging or running at a rate of 5 miles per hour.  · Biking at a rate of at least 10 miles per hour.  · Lap swimming.  · Roller-skating or in-line skating.  · Cross-country skiing.  · Vigorous competitive sports, such as football, basketball, and soccer.  · Jumping rope.  · Aerobic dancing.  How can I be more active in my day-to-day activities?  · Use the stairs instead of the elevator.  · Take a walk during your lunch break.  · If you drive, park your car farther away from work or school.  · If you take public transportation, get off one stop early and walk the rest of the way.  · Make all of your phone calls while standing up and walking around.  · Get up, stretch, and walk around every 30 minutes throughout the day.  What guidelines should I follow while exercising?  · Do not exercise so much that you hurt yourself, feel dizzy, or get very short of breath.  · Consult your health care provider prior to starting a new exercise program.  · Wear comfortable clothes and shoes with good support.  · Drink plenty of water while you exercise to prevent dehydration or heat stroke. Body water is lost during exercise and must be  replaced.  · Work out until you breathe faster and your heart beats faster.  This information is not intended to replace advice given to you by your health care provider. Make sure you discuss any questions you have with your health care provider.  Document Released: 01/20/2012 Document Revised: 05/25/2017 Document Reviewed: 05/21/2015  [employer] Interactive Patient Education © 2017 [employer]    Heart Failure  Heart failure is a condition in which the heart has trouble pumping blood because it has become weak or stiff. This means that the heart does not pump blood efficiently for the body to work well. For some people with heart failure, fluid may back up into the lungs and there may be swelling (edema) in the lower legs. Heart failure is usually a long-term (chronic) condition. It is important for you to take good care of yourself and follow the treatment plan from your health care provider.  What are the causes?  This condition is caused by some health problems, including:  · High blood pressure (hypertension). Hypertension causes the heart muscle to work harder than normal. High blood pressure eventually causes the heart to become stiff and weak.  · Coronary artery disease (CAD). CAD is the buildup of cholesterol and fat (plaques) in the arteries of the heart.  · Heart attack (myocardial infarction). Injured tissue, which is caused by the heart attack, does not contract as well and the heart's ability to pump blood is weakened.  · Abnormal heart valves. When the heart valves do not open and close properly, the heart muscle must pump harder to keep the blood flowing.  · Heart muscle disease (cardiomyopathy or myocarditis). Heart muscle disease is damage to the heart muscle from a variety of causes, such as drug or alcohol abuse, infections, or unknown causes. These can increase the risk of heart failure.  · Lung disease. When the lungs do not work properly, the heart must work harder.  What increases the  risk?  Risk of heart failure increases as a person ages. This condition is also more likely to develop in people who:  · Are overweight.  · Are male.  · Smoke or chew tobacco.  · Abuse alcohol or illegal drugs.  · Have taken medicines that can damage the heart, such as chemotherapy drugs.  · Have diabetes.  ¨ High blood sugar (glucose) is associated with high fat (lipid) levels in the blood.  ¨ Diabetes can also damage tiny blood vessels that carry nutrients to the heart muscle.  · Have abnormal heart rhythms.  · Have thyroid problems.  · Have low blood counts (anemia).  What are the signs or symptoms?  Symptoms of this condition include:  · Shortness of breath with activity, such as when climbing stairs.  · Persistent cough.  · Swelling of the feet, ankles, legs, or abdomen.  · Unexplained weight gain.  · Difficulty breathing when lying flat (orthopnea).  · Waking from sleep because of the need to sit up and get more air.  · Rapid heartbeat.  · Fatigue and loss of energy.  · Feeling light-headed, dizzy, or close to fainting.  · Loss of appetite.  · Nausea.  · Increased urination during the night (nocturia).  · Confusion.  How is this diagnosed?  This condition is diagnosed based on:  · Medical history, symptoms, and a physical exam.  · Diagnostic tests, which may include:  ¨ Echocardiogram.  ¨ Electrocardiogram (ECG).  ¨ Chest X-ray.  ¨ Blood tests.  ¨ Exercise stress test.  ¨ Radionuclide scans.  ¨ Cardiac catheterization and angiogram.  How is this treated?  Treatment for this condition is aimed at managing the symptoms of heart failure. Medicines, behavioral changes, or other treatments may be necessary to treat heart failure.  Medicines   These may include:  · Angiotensin-converting enzyme (ACE) inhibitors. This type of medicine blocks the effects of a blood protein called angiotensin-converting enzyme. ACE inhibitors relax (dilate) the blood vessels and help to lower blood pressure.  · Angiotensin receptor  blockers (ARBs). This type of medicine blocks the actions of a blood protein called angiotensin. ARBs dilate the blood vessels and help to lower blood pressure.  · Water pills (diuretics). Diuretics cause the kidneys to remove salt and water from the blood. The extra fluid is removed through urination, leaving a lower volume of blood that the heart has to pump.  · Beta blockers. These improve heart muscle strength and they prevent the heart from beating too quickly.  · Digoxin. This increases the force of the heartbeat.  Healthy behavior changes   These may include:  · Reaching and maintaining a healthy weight.  · Stopping smoking or chewing tobacco.  · Eating heart-healthy foods.  · Limiting or avoiding alcohol.  · Stopping use of street drugs (illegal drugs).  · Physical activity.  Other treatments   These may include:  · Surgery to open blocked coronary arteries or repair damaged heart valves.  · Placement of a biventricular pacemaker to improve heart muscle function (cardiac resynchronization therapy). This device paces both the right ventricle and left ventricle.  · Placement of a device to treat serious abnormal heart rhythms (implantable cardioverter defibrillator, or ICD).  · Placement of a device to improve the pumping ability of the heart (left ventricular assist device, or LVAD).  · Heart transplant. This can cure heart failure, and it is considered for certain patients who do not improve with other therapies.  Follow these instructions at home:  Medicines   · Take over-the-counter and prescription medicines only as told by your health care provider. Medicines are important in reducing the workload of your heart, slowing the progression of heart failure, and improving your symptoms.  ¨ Do not stop taking your medicine unless your health care provider told you to do that.  ¨ Do not skip any dose of medicine.  ¨ Refill your prescriptions before you run out of medicine. You need your medicines every  day.  Eating and drinking     · Eat heart-healthy foods. Talk with a dietitian to make an eating plan that is right for you.  ¨ Choose foods that contain no trans fat and are low in saturated fat and cholesterol. Healthy choices include fresh or frozen fruits and vegetables, fish, lean meats, legumes, fat-free or low-fat dairy products, and whole-grain or high-fiber foods.  ¨ Limit salt (sodium) if directed by your health care provider. Sodium restriction may reduce symptoms of heart failure. Ask a dietitian to recommend heart-healthy seasonings.  ¨ Use healthy cooking methods instead of frying. Healthy methods include roasting, grilling, broiling, baking, poaching, steaming, and stir-frying.  · Limit your fluid intake if directed by your health care provider. Fluid restriction may reduce symptoms of heart failure.  Lifestyle   · Stop smoking or using chewing tobacco. Nicotine and tobacco can damage your heart and your blood vessels. Do not use nicotine gum or patches before talking to your health care provider.  · Limit alcohol intake to no more than 1 drink per day for non-pregnant women and 2 drinks per day for men. One drink equals 12 oz of beer, 5 oz of wine, or 1½ oz of hard liquor.  ¨ Drinking more than that is harmful to your heart. Tell your health care provider if you drink alcohol several times a week.  ¨ Talk with your health care provider about whether any level of alcohol use is safe for you.  ¨ If your heart has already been damaged by alcohol or you have severe heart failure, drinking alcohol should be stopped completely.  · Stop use of illegal drugs.  · Lose weight if directed by your health care provider. Weight loss may reduce symptoms of heart failure.  · Do moderate physical activity if directed by your health care provider. People who are elderly and people with severe heart failure should consult with a health care provider for physical activity recommendations.  Monitor important information    · Weigh yourself every day. Keeping track of your weight daily helps you to notice excess fluid sooner.  ¨ Weigh yourself every morning after you urinate and before you eat breakfast.  ¨ Wear the same amount of clothing each time you weigh yourself.  ¨ Record your daily weight. Provide your health care provider with your weight record.  · Monitor and record your blood pressure as told by your health care provider.  · Check your pulse as told by your health care provider.  Dealing with extreme temperatures   · If the weather is extremely hot:  ¨ Avoid vigorous physical activity.  ¨ Use air conditioning or fans or seek a cooler location.  ¨ Avoid caffeine and alcohol.  ¨ Wear loose-fitting, lightweight, and light-colored clothing.  · If the weather is extremely cold:  ¨ Avoid vigorous physical activity.  ¨ Layer your clothes.  ¨ Wear mittens or gloves, a hat, and a scarf when you go outside.  ¨ Avoid alcohol.  General instructions   · Manage other health conditions such as hypertension, diabetes, thyroid disease, or abnormal heart rhythms as told by your health care provider.  · Learn to manage stress. If you need help to do this, ask your health care provider.  · Plan rest periods when fatigued.  · Get ongoing education and support as needed.  · Participate in or seek rehabilitation as needed to maintain or improve independence and quality of life.  · Stay up to date with immunizations. Keeping current on pneumococcal and influenza immunizations is especially important to prevent respiratory infections.  · Keep all follow-up visits as told by your health care provider. This is important.  Contact a health care provider if:  · You have a rapid weight gain.  · You have increasing shortness of breath that is unusual for you.  · You are unable to participate in your usual physical activities.  · You tire easily.  · You cough more than normal, especially with physical activity.  · You have any swelling or more swelling  in areas such as your hands, feet, ankles, or abdomen.  · You are unable to sleep because it is hard to breathe.  · You feel like your heart is beating quickly (palpitations).  · You become dizzy or light-headed when you stand up.  Get help right away if:  · You have difficulty breathing.  · You notice or your family notices a change in your awareness, such as having trouble staying awake or having difficulty with concentration.  · You have pain or discomfort in your chest.  · You have an episode of fainting (syncope).  This information is not intended to replace advice given to you by your health care provider. Make sure you discuss any questions you have with your health care provider.  Document Released: 12/18/2006 Document Revised: 08/22/2017 Document Reviewed: 07/12/2017  [employer] Interactive Patient Education © 2017 [employer]

## 2018-04-26 NOTE — PROGRESS NOTES
Subjective:     Encounter Date:04/26/2018      Patient ID: Cary Shah is a 77 y.o. female. She presents today for one year follow up of coronary artery disease s/p multiple coronary artery stents and subsequent coronary artery bypass grafting X2 in 2003. She has a history of chronic systolic congestive heart failure s/p AICD/pacemaker, chronic atrial fibrillation not on anticoagulation due to history of vaginal bleeding on Pradaxa, hyperlipidemia, type 2 diabetes mellitus and obesity. She complains of a one month history of increased dyspnea with exertion and shortness of breath, bilateral lower extremity edema and 10 pound weight gain. She reports intermittent episodes of left sided chest discomfort that occur after eating. She denies palpitations, dizziness, syncope, orthopnea or PND. She does report decreased stamina related to shortness of breath.     Chief Complaint:  Congestive Heart Failure   Presents for follow-up visit. Associated symptoms include chest pain, edema and shortness of breath. Pertinent negatives include no abdominal pain, chest pressure, claudication, fatigue, muscle weakness, near-syncope, nocturia, orthopnea, palpitations, paroxysmal nocturnal dyspnea or unexpected weight change. The symptoms have been worsening. Her past medical history is significant for CAD.   Atrial Fibrillation   Presents for follow-up visit. Symptoms include chest pain and shortness of breath. Symptoms are negative for an AICD problem, bradycardia, dizziness, hemodynamic instability, hypertension, hypotension, pacemaker problem, palpitations, syncope, tachycardia and weakness. The symptoms have been stable. Past medical history includes atrial fibrillation, CAD and CHF.   Coronary Artery Disease   Presents for follow-up visit. Symptoms include chest pain, leg swelling, shortness of breath and weight gain. Pertinent negatives include no chest pressure, chest tightness, dizziness, muscle weakness or  palpitations. Risk factors do not include hypertension. Her past medical history is significant for CHF. The symptoms have been stable. Compliance with diet is variable. Compliance with exercise is variable. Compliance with medications is good.       The following portions of the patient's history were reviewed and updated as appropriate: allergies, current medications, past family history, past medical history, past social history, past surgical history and problem list.     Allergies   Allergen Reactions   • Levaquin [Levofloxacin]      FLUOROQUINOLONES   • Bactrim [Sulfamethoxazole-Trimethoprim] Palpitations       Current Outpatient Prescriptions:   •  aspirin  MG tablet, Take 325 mg by mouth Daily., Disp: , Rfl:   •  Camphor-Eucalyptus-Menthol (VICKS VAPORUB EX), Apply  topically., Disp: , Rfl:   •  colchicine-probenecid (COL-BENEMID) 0.5-500 MG tablet, Take 1 tablet by mouth 2 (Two) Times a Day., Disp: , Rfl:   •  DIGOX 250 MCG tablet, TAKE 1 TABLET BY MOUTH DAILY, Disp: 90 tablet, Rfl: 0  •  felodipine (PLENDIL) 5 MG 24 hr tablet, Take 5 mg by mouth Daily., Disp: , Rfl:   •  furosemide (LASIX) 40 MG tablet, Take 40 mg by mouth 2 (Two) Times a Day. Takes 1 and 1/2 (60mg) a day, Disp: , Rfl:   •  guaiFENesin (MUCINEX) 600 MG 12 hr tablet, Take 1,200 mg by mouth 2 (Two) Times a Day., Disp: , Rfl:   •  HYDROcodone-acetaminophen (NORCO) 7.5-325 MG per tablet, Take 1 tablet by mouth Every 6 (Six) Hours As Needed for Moderate Pain (4-6)., Disp: , Rfl:   •  insulin glargine (LANTUS) 100 UNIT/ML injection, Inject  under the skin Daily., Disp: , Rfl:   •  Insulin Lispro (HUMALOG) 100 UNIT/ML solution cartridge, Inject  under the skin., Disp: , Rfl:   •  levothyroxine (SYNTHROID, LEVOTHROID) 175 MCG tablet, Take 175 mcg by mouth Daily., Disp: , Rfl:   •  olmesartan (BENICAR) 40 MG tablet, TAKE 1 TABLET BY MOUTH DAILY, Disp: 90 tablet, Rfl: 3  •  pantoprazole (PROTONIX) 40 MG EC tablet, Take 40 mg by mouth Daily.,  Disp: , Rfl:   •  vitamin E 400 UNIT capsule, Take 400 Units by mouth Daily., Disp: , Rfl:   •  metoprolol succinate XL (TOPROL-XL) 25 MG 24 hr tablet, Take 1 tablet by mouth Daily., Disp: 90 tablet, Rfl: 3  •  spironolactone (ALDACTONE) 25 MG tablet, Take 1 tablet by mouth Daily., Disp: 90 tablet, Rfl: 3  Past Medical History:   Diagnosis Date   • AICD present, double chamber    • Arteriosclerosis of coronary artery bypass graft     stents and CABG   • Atherosclerosis of native coronary artery with angina pectoris    • Back pain, chronic    • Cardiac device in situ     Cardiac device in situ (OPTIVOL REMOTE)   • Cardiac device in situ     Cardiac device in situ, other   • Chest pain    • Chronic atrial fibrillation    • Chronic kidney disease due to diabetes mellitus    • Chronic systolic heart failure     EF 45-50% 11/2014 echo   • Diabetes mellitus due to underlying condition with complications    • Diabetes mellitus type 2, uncontrolled, without complications    • H/O acute myocardial infarction    • History of coronary artery bypass graft     Coronary Artery Bypass, Two   • Hyperlipidemia    • Hypothyroidism    • Ischemic cardiomyopathy    • Malignant hypertension     better control   • Myocardial infarction    • Non-compliance    • Shortness of breath    • Shoulder pain, left    • Sleep apnea     not officially diagnosed   • Snoring    • Ventricular fibrillation      Past Surgical History:   Procedure Laterality Date   • CARDIAC CATHETERIZATION     • CARDIAC DEFIBRILLATOR PLACEMENT     • CATARACT EXTRACTION     • CORONARY ARTERY BYPASS GRAFT     • CORONARY STENT PLACEMENT     • INSERT / REPLACE / REMOVE PACEMAKER     • OTHER SURGICAL HISTORY      sweat gland removed   • OTHER SURGICAL HISTORY      reconstructive surgery after a spider bite   • TOTAL THYROIDECTOMY     • TUBAL ABDOMINAL LIGATION       Family History   Problem Relation Age of Onset   • Coronary artery disease Mother    • Heart attack Mother    •  Coronary artery disease Father    • Heart attack Father    • Heart disease Sister    • Heart attack Sister    • Coronary artery disease Sister    • Heart attack Brother    • Heart disease Brother    • Coronary artery disease Brother    • No Known Problems Maternal Grandmother    • No Known Problems Maternal Grandfather    • No Known Problems Paternal Grandmother    • No Known Problems Paternal Grandfather    • Heart disease Brother    • Heart disease Brother    • Heart attack Brother      Social History     Social History   • Marital status: Single     Spouse name: N/A   • Number of children: N/A   • Years of education: N/A     Occupational History   • Not on file.     Social History Main Topics   • Smoking status: Former Smoker     Packs/day: 0.25     Types: Cigarettes     Start date: 1960   • Smokeless tobacco: Never Used   • Alcohol use No   • Drug use: No   • Sexual activity: Defer     Other Topics Concern   • Not on file     Social History Narrative   • No narrative on file         Review of Systems   Constitution: Positive for weight gain. Negative for chills, decreased appetite, fatigue, fever, weakness, malaise/fatigue, night sweats, unexpected weight change and weight loss.   HENT: Negative for nosebleeds.    Eyes: Negative for visual disturbance.   Cardiovascular: Positive for chest pain, dyspnea on exertion and leg swelling. Negative for claudication, near-syncope, orthopnea, palpitations, paroxysmal nocturnal dyspnea and syncope.   Respiratory: Positive for shortness of breath. Negative for chest tightness, cough, hemoptysis, snoring and wheezing.    Endocrine: Negative for cold intolerance and heat intolerance.   Hematologic/Lymphatic: Does not bruise/bleed easily.   Skin: Negative for rash.   Musculoskeletal: Negative for back pain, falls and muscle weakness.   Gastrointestinal: Negative for abdominal pain, change in bowel habit, constipation, diarrhea, dysphagia, heartburn, nausea and vomiting.  "  Genitourinary: Negative for hematuria and nocturia.   Neurological: Negative for dizziness, headaches and light-headedness.   Psychiatric/Behavioral: Negative for altered mental status.   Allergic/Immunologic: Negative for persistent infections.         ECG 12 Lead  Date/Time: 4/26/2018 11:48 AM  Performed by: PRASANTH WILD  Authorized by: PRASANTH WILD   Comparison: compared with previous ECG from 4/7/2017  Similar to previous ECG  Rhythm: atrial fibrillation  Rate: normal  BPM: 94  Clinical impression: abnormal ECG          /85 (BP Location: Right arm, Patient Position: Sitting, Cuff Size: Large Adult)   Pulse 94   Resp 18   Ht 160 cm (63\")   Wt 104 kg (230 lb)   Breastfeeding? No   BMI 40.74 kg/m²        Objective:     Physical Exam   Constitutional: She is oriented to person, place, and time. Vital signs are normal. She appears well-developed and well-nourished. No distress.   HENT:   Head: Normocephalic and atraumatic.   Right Ear: External ear normal.   Left Ear: External ear normal.   Nose: Nose normal.   Eyes: Conjunctivae are normal. Pupils are equal, round, and reactive to light. Right eye exhibits no discharge. Left eye exhibits no discharge.   Neck: Normal range of motion. Neck supple. No JVD present. Carotid bruit is not present. No tracheal deviation present. No thyromegaly present.   Cardiovascular: Normal rate, normal heart sounds, intact distal pulses and normal pulses.  An irregularly irregular rhythm present. PMI is not displaced.  Exam reveals no gallop and no friction rub.    No murmur heard.  Pulses:       Radial pulses are 2+ on the right side, and 2+ on the left side.        Dorsalis pedis pulses are 2+ on the right side, and 2+ on the left side.        Posterior tibial pulses are 2+ on the right side, and 2+ on the left side.   Pulmonary/Chest: Effort normal and breath sounds normal. No respiratory distress. She has no decreased breath sounds. She has no wheezes. She has no " rhonchi. She has no rales. She exhibits no tenderness.   Abdominal: Soft. She exhibits no distension. There is no tenderness.   Musculoskeletal: Normal range of motion. She exhibits edema (2+ pitting edema in bilateral lower extremities). She exhibits no tenderness or deformity.   Neurological: She is alert and oriented to person, place, and time.   Skin: Skin is warm and dry. No rash noted. She is not diaphoretic. No erythema. No pallor.   Psychiatric: She has a normal mood and affect. Her behavior is normal. Judgment and thought content normal.   Vitals reviewed.        Assessment:          Diagnosis Plan   1. Coronary artery disease involving coronary bypass graft of native heart with angina pectoris  Atypical chest pain after eating, likely GI related.    2. Status post coronary artery stent placement     3. S/P CABG x 2  Continues on aspirin.    4. Chronic atrial fibrillation  Rate controlled. No DOAC due to history of vaginal bleeding on Pradaxa.    5. History of vaginal bleeding     6. Chronic systolic congestive heart failure  Start metoprolol succinate 25 mg by mouth once daily. Start spironolactone 25 mg by mouth once daily. Stop potassium. Basic Metabolic Panel in one week.    7. Mixed hyperlipidemia  Pt stopped statin therapy due to myalgias. Managed by PCP.    8. Type 2 diabetes mellitus with complication, with long-term current use of insulin     9. Class 3 obesity due to excess calories with serious comorbidity and body mass index (BMI) of 40.0 to 44.9 in adult  Patient's Body mass index is 40.74 kg/m². BMI is above normal parameters. Follow-up plan includes:  educational material.            Plan:       1. Start metoprolol succinate 25 mg by mouth once daily.  2. Start spironolactone 25 mg by mouth once daily.  3. Stop potassium.  4. BMP in one week.  5. Continue other medications as previously prescribed.   6. Reviewed signs and symptoms of CHF and what to report with the patient. Patient instructed  to restrict sodium and weigh daily. Report weight gain of greater than 2 lbs overnight or 5 lbs in 1 week. Pt verbalized understanding of instructions and plan of care.   7. Follow up with PCP for blood pressure, cholesterol and diabetes management and routine lab work.  8. Continue heart healthy diet and regular exercise as tolerated.   9. Follow up with mid-level provider in one month, or sooner if needed.

## 2018-05-16 ENCOUNTER — CLINICAL SUPPORT (OUTPATIENT)
Dept: CARDIOLOGY | Facility: CLINIC | Age: 78
End: 2018-05-16

## 2018-05-16 DIAGNOSIS — I48.20 CHRONIC ATRIAL FIBRILLATION (HCC): ICD-10-CM

## 2018-05-16 DIAGNOSIS — Z95.810 AICD (AUTOMATIC CARDIOVERTER/DEFIBRILLATOR) PRESENT: Primary | ICD-10-CM

## 2018-05-16 DIAGNOSIS — I50.22 CHRONIC SYSTOLIC CONGESTIVE HEART FAILURE (HCC): ICD-10-CM

## 2018-05-16 PROCEDURE — 93295 DEV INTERROG REMOTE 1/2/MLT: CPT | Performed by: INTERNAL MEDICINE

## 2018-05-16 PROCEDURE — 93296 REM INTERROG EVL PM/IDS: CPT | Performed by: INTERNAL MEDICINE

## 2018-05-16 NOTE — PROGRESS NOTES
Dual Chamber AICD Evaluation Report  REMOTE/CARELINK    May 16, 2018    Primary Cardiologist: Hiram  : [employer] Model: Evera XT DR  Implant date: 05/28/2015    Reason for evaluation: routine  Indication for AICD: chronic a-fib and chronic systolic congestive heart failure    Measurements  Atrial sensing - P wave: 0.1 mV  Atrial threshold: N/R--Chronic AF  Atrial lead impedance: 437 ohms  Ventricular sensing - R wave: 4.5 mV  Ventricular threshold: 1.25 V @ 0.4 ms  Ventricular lead impedance:  779 ohms  Shock impedance:  RV- 44 ohms   SVC- 60 ohms      Diagnostic Data  Atrial paced: 0 %  Ventricular paced: 39.3 %    Episodes recorded since 02/12/2018  AT/AF burden 98.7%.  Patient not anticoagulated s/t history of vaginal bleeding while taking Pradaxa.  Histogram:  Average ventricular rates during AT/AF are below 90 bpm.   1 episode of AF with RVR on 03/02/2018:  Max ventricular rate 276 bpm, duration approximately 1-2 seconds.  Patient seen by APRN on 4/26/2018 and started on Toprol XL 25mg by mouth once daily.    Battery status: satisfactory, estimated 4.2 years remaining      Final Parameters  Mode: VVIR  Lower rate: 60 bpm  Ventricular - Amplitude: 2.5 V Pulse width: 0.4 ms Sensitivity: 0.3 mV   Changes made: N/A--Remote transmission  Conclusions: normal AICD function, no therapy delivered, stable pacing and sensing thresholds and adequate battery reserve    Follow up: 3 months

## 2018-06-11 ENCOUNTER — OFFICE VISIT (OUTPATIENT)
Dept: CARDIOLOGY | Facility: CLINIC | Age: 78
End: 2018-06-11

## 2018-06-11 VITALS
SYSTOLIC BLOOD PRESSURE: 168 MMHG | HEIGHT: 63 IN | OXYGEN SATURATION: 94 % | HEART RATE: 78 BPM | DIASTOLIC BLOOD PRESSURE: 80 MMHG | WEIGHT: 232 LBS | BODY MASS INDEX: 41.11 KG/M2

## 2018-06-11 DIAGNOSIS — I50.22 CHRONIC SYSTOLIC CONGESTIVE HEART FAILURE (HCC): ICD-10-CM

## 2018-06-11 DIAGNOSIS — E78.2 MIXED HYPERLIPIDEMIA: ICD-10-CM

## 2018-06-11 DIAGNOSIS — I10 ESSENTIAL HYPERTENSION: ICD-10-CM

## 2018-06-11 DIAGNOSIS — I48.20 CHRONIC ATRIAL FIBRILLATION (HCC): Primary | ICD-10-CM

## 2018-06-11 DIAGNOSIS — I25.709 CORONARY ARTERY DISEASE INVOLVING CORONARY BYPASS GRAFT OF NATIVE HEART WITH ANGINA PECTORIS (HCC): ICD-10-CM

## 2018-06-11 PROCEDURE — 99214 OFFICE O/P EST MOD 30 MIN: CPT | Performed by: INTERNAL MEDICINE

## 2018-06-11 PROCEDURE — 93000 ELECTROCARDIOGRAM COMPLETE: CPT | Performed by: INTERNAL MEDICINE

## 2018-06-11 RX ORDER — ERGOCALCIFEROL 1.25 MG/1
2000 CAPSULE ORAL DAILY
COMMUNITY

## 2018-06-11 RX ORDER — GABAPENTIN 100 MG/1
100 CAPSULE ORAL 3 TIMES DAILY
Qty: 100 CAPSULE | Refills: 11 | Status: SHIPPED | OUTPATIENT
Start: 2018-06-11 | End: 2019-05-22 | Stop reason: SDUPTHER

## 2018-06-11 NOTE — PROGRESS NOTES
Referring Provider: Nathen Garza MD    Reason for Follow-up Visit: CHF    Subjective .   Chief Complaint:   Chief Complaint   Patient presents with   • Follow-up     to discuss the Watchman device.   • Atrial Fibrillation     having no palpitations, just sob and fatigue always.       History of present illness:  Cary Shah is a 77 y.o. yo female with history of CHF, s/p AICD due to sustained Vtach. Has chronic afib and is not anticoagulated due to vaginal bleeding. She does not want to consider the watchman device.        History  Past Medical History:   Diagnosis Date   • AICD present, double chamber    • Arteriosclerosis of coronary artery bypass graft     stents and CABG   • Atherosclerosis of native coronary artery with angina pectoris    • Back pain, chronic    • Cardiac device in situ     Cardiac device in situ (OPTIVOL REMOTE)   • Cardiac device in situ     Cardiac device in situ, other   • Chest pain    • Chronic atrial fibrillation    • Chronic kidney disease due to diabetes mellitus    • Chronic systolic heart failure     EF 45-50% 11/2014 echo   • Diabetes mellitus due to underlying condition with complications    • Diabetes mellitus type 2, uncontrolled, without complications    • Gout    • H/O acute myocardial infarction    • History of coronary artery bypass graft     Coronary Artery Bypass, Two   • Hyperlipidemia    • Hypothyroidism    • Ischemic cardiomyopathy    • Malignant hypertension     better control   • Myocardial infarction    • Non-compliance    • Shortness of breath    • Shoulder pain, left    • Sleep apnea     not officially diagnosed   • Snoring    • Ventricular fibrillation    ,   Past Surgical History:   Procedure Laterality Date   • CARDIAC CATHETERIZATION     • CARDIAC DEFIBRILLATOR PLACEMENT     • CATARACT EXTRACTION     • CORONARY ARTERY BYPASS GRAFT     • CORONARY STENT PLACEMENT     • INSERT / REPLACE / REMOVE PACEMAKER     • OTHER SURGICAL HISTORY      sweat gland  removed   • OTHER SURGICAL HISTORY      reconstructive surgery after a spider bite   • TOTAL THYROIDECTOMY     • TUBAL ABDOMINAL LIGATION     ,   Family History   Problem Relation Age of Onset   • Coronary artery disease Mother    • Heart attack Mother    • Coronary artery disease Father    • Heart attack Father    • Heart disease Sister    • Heart attack Sister    • Coronary artery disease Sister    • Heart attack Brother    • Heart disease Brother    • Coronary artery disease Brother    • No Known Problems Maternal Grandmother    • No Known Problems Maternal Grandfather    • No Known Problems Paternal Grandmother    • No Known Problems Paternal Grandfather    • Heart disease Brother    • Heart disease Brother    • Heart attack Brother    ,   Social History   Substance Use Topics   • Smoking status: Current Some Day Smoker     Packs/day: 0.25     Types: Cigarettes     Start date: 1960   • Smokeless tobacco: Never Used      Comment: 1 cigarette every now and then when gets a craving for one.  does not want to quit yet.   • Alcohol use No   ,     Medications  Current Outpatient Prescriptions   Medication Sig Dispense Refill   • aspirin  MG tablet Take 325 mg by mouth Daily.     • Camphor-Eucalyptus-Menthol (VICKS VAPORUB EX) Apply  topically.     • colchicine-probenecid (COL-BENEMID) 0.5-500 MG tablet Take 1 tablet by mouth 2 (Two) Times a Day.     • DIGOX 250 MCG tablet TAKE 1 TABLET BY MOUTH DAILY 90 tablet 0   • felodipine (PLENDIL) 5 MG 24 hr tablet Take 5 mg by mouth Daily.     • furosemide (LASIX) 40 MG tablet Take 40 mg by mouth Daily. Takes 1 and 1/2 (60mg) a day     • guaiFENesin (MUCINEX) 600 MG 12 hr tablet Take 1,200 mg by mouth 2 (Two) Times a Day.     • HYDROcodone-acetaminophen (NORCO) 7.5-325 MG per tablet Take 1 tablet by mouth Every 6 (Six) Hours As Needed for Moderate Pain (4-6).     • insulin glargine (LANTUS) 100 UNIT/ML injection Inject  under the skin Daily.     • Insulin Lispro (HUMALOG)  "100 UNIT/ML solution cartridge Inject  under the skin.     • levothyroxine (SYNTHROID, LEVOTHROID) 175 MCG tablet Take 175 mcg by mouth Daily.     • metoprolol succinate XL (TOPROL-XL) 25 MG 24 hr tablet Take 1 tablet by mouth Daily. 90 tablet 3   • olmesartan (BENICAR) 40 MG tablet TAKE 1 TABLET BY MOUTH DAILY 90 tablet 3   • pantoprazole (PROTONIX) 40 MG EC tablet Take 40 mg by mouth Daily.     • spironolactone (ALDACTONE) 25 MG tablet Take 1 tablet by mouth Daily. 90 tablet 3   • vitamin D (ERGOCALCIFEROL) 79599 units capsule capsule Take 50,000 Units by mouth 1 (One) Time Per Week.     • vitamin E 400 UNIT capsule Take 400 Units by mouth Daily.       No current facility-administered medications for this visit.        Allergies:  Bactrim [sulfamethoxazole-trimethoprim] and Levaquin [levofloxacin]    Review of Systems  Review of Systems   HENT: Negative for nosebleeds.    Cardiovascular: Positive for dyspnea on exertion and palpitations. Negative for chest pain, claudication, irregular heartbeat, leg swelling, near-syncope, orthopnea, paroxysmal nocturnal dyspnea and syncope.   Respiratory: Negative for cough, hemoptysis and shortness of breath.    Musculoskeletal: Positive for muscle cramps.   Gastrointestinal: Negative for dysphagia, hematemesis and melena.   Genitourinary: Negative for hematuria.   Psychiatric/Behavioral: Positive for depression.   All other systems reviewed and are negative.      Objective     Physical Exam:  /80 (BP Location: Left arm, Patient Position: Sitting, Cuff Size: Large Adult)   Pulse 78   Ht 160 cm (63\")   Wt 105 kg (232 lb)   SpO2 94%   BMI 41.10 kg/m²   Physical Exam   Constitutional: She is oriented to person, place, and time. She appears well-developed and well-nourished. No distress.   HENT:   Head: Normocephalic and atraumatic.   Eyes: No scleral icterus.   Neck: Normal range of motion.   Cardiovascular: Normal rate and normal heart sounds.  An irregularly irregular " rhythm present. Exam reveals no gallop and no friction rub.    No murmur heard.  Pulmonary/Chest: Effort normal and breath sounds normal. No respiratory distress. She has no wheezes. She has no rales.   Abdominal: Soft. Bowel sounds are normal. She exhibits no distension. There is no tenderness.   Musculoskeletal: She exhibits edema.   Neurological: She is alert and oriented to person, place, and time. No cranial nerve deficit.   Skin: Skin is warm and dry. She is not diaphoretic. No erythema.   Psychiatric: She has a normal mood and affect. Her behavior is normal.       Results Review:    ECG 12 Lead  Date/Time: 6/11/2018 10:27 AM  Performed by: LUDWIN FITZPATRICK  Authorized by: LUDWIN FITZPATRICK               Hospital Outpatient Visit on 04/10/2017   Component Date Value Ref Range Status   • BSA 04/10/2017 2.0  m^2 Preliminary   • [facility] CV ECHO JI - BZI_BMI 04/10/2017 39.9  kilograms/m^2 Preliminary   • BH CV ECHO JI - BSA([place]) 04/10/2017 2.2  m^2 Preliminary   • BH CV ECHO JI - BZI_METRIC_WEIGHT 04/10/2017 102.1  kg Preliminary   • BH CV ECHO JI - BZI_METRIC_HEIGHT 04/10/2017 160.0  cm Preliminary   • Target HR (85%) 04/10/2017 123  bpm Final   • Max. Pred. HR (100%) 04/10/2017 145  bpm Final   •  CV STRESS PROTOCOL 1 04/10/2017 Pharmacologic   Final   • Stage 1 04/10/2017 1   Final   • HR Stage 1 04/10/2017 105   Final   • BP Stage 1 04/10/2017 167/68   Final   • Duration Min Stage 1 04/10/2017 3   Final   • Duration Sec Stage 1 04/10/2017 0   Final   • Stress Dose Dobutamine Stage 1 04/10/2017 10   Final   • Stage 2 04/10/2017 2   Final   • HR Stage 2 04/10/2017 96   Final   • Duration Min Stage 2 04/10/2017 3   Final   • Duration Sec Stage 2 04/10/2017 0   Final   • Stress Dose Dobutamine Stage 2 04/10/2017 20   Final   • Stage 3 04/10/2017 3   Final   • HR Stage 3 04/10/2017 135   Final   • BP Stage 3 04/10/2017 176/63   Final   • Duration Min Stage 3 04/10/2017 2   Final   • Duration Sec Stage 3  04/10/2017 13   Final   • Stress Dose Dobutamine Stage 3 04/10/2017 30   Final   • Baseline HR 04/10/2017 80  bpm Final   • Baseline BP 04/10/2017 147/70  mmHg Final   • Peak HR 04/10/2017 135  bpm Final   • Percent Max Pred HR 04/10/2017 93.10  % Final   • Percent Target HR 04/10/2017 110  % Final   • Peak BP 04/10/2017 176/63  mmHg Final   • Recovery HR 04/10/2017 73  bpm Final   • Recovery BP 04/10/2017 115/62  mmHg Final       Assessment/Plan   Cary was seen today for follow-up and atrial fibrillation.    Diagnoses and all orders for this visit:    Chronic systolic congestive heart failure, well compensated    Coronary artery disease involving coronary bypass graft of native heart with angina pectoris, The patient denies chest pain, shortness of breath, dyspnea on exertion, orthopnea, PND, edema. There is no evidence of ongoing ischemia    Mixed hyperlipidemia, Patient's statin therapy is followed by PCP.    Chronic atrial fibrillation, not anticoagulated and she is not interested in the watchman device    Essential hypertension, running higher than normal    Muscle Cramps, will start a trial of neurontin and repeat K level since she has started on spironalactone    Patient's Body mass index is 41.1 kg/m². BMI is above normal parameters. Recommendations include: exercise counseling.

## 2018-07-19 RX ORDER — OLMESARTAN MEDOXOMIL 40 MG/1
TABLET ORAL
Qty: 90 TABLET | Refills: 3 | Status: SHIPPED | OUTPATIENT
Start: 2018-07-19 | End: 2019-07-12 | Stop reason: SDUPTHER

## 2018-09-11 ENCOUNTER — OFFICE VISIT (OUTPATIENT)
Dept: CARDIOLOGY | Facility: CLINIC | Age: 78
End: 2018-09-11

## 2018-09-11 ENCOUNTER — CLINICAL SUPPORT (OUTPATIENT)
Dept: CARDIOLOGY | Facility: CLINIC | Age: 78
End: 2018-09-11

## 2018-09-11 VITALS
HEIGHT: 63 IN | HEART RATE: 91 BPM | DIASTOLIC BLOOD PRESSURE: 80 MMHG | BODY MASS INDEX: 41.11 KG/M2 | RESPIRATION RATE: 18 BRPM | SYSTOLIC BLOOD PRESSURE: 145 MMHG | WEIGHT: 232 LBS | OXYGEN SATURATION: 97 %

## 2018-09-11 DIAGNOSIS — Z79.4 TYPE 2 DIABETES MELLITUS WITH COMPLICATION, WITH LONG-TERM CURRENT USE OF INSULIN (HCC): ICD-10-CM

## 2018-09-11 DIAGNOSIS — Z95.1 S/P CABG X 2: ICD-10-CM

## 2018-09-11 DIAGNOSIS — I50.22 CHRONIC SYSTOLIC CONGESTIVE HEART FAILURE (HCC): ICD-10-CM

## 2018-09-11 DIAGNOSIS — Z87.42 HISTORY OF VAGINAL BLEEDING: ICD-10-CM

## 2018-09-11 DIAGNOSIS — Z95.5 STATUS POST CORONARY ARTERY STENT PLACEMENT: ICD-10-CM

## 2018-09-11 DIAGNOSIS — E11.8 TYPE 2 DIABETES MELLITUS WITH COMPLICATION, WITH LONG-TERM CURRENT USE OF INSULIN (HCC): ICD-10-CM

## 2018-09-11 DIAGNOSIS — IMO0001 CLASS 3 OBESITY DUE TO EXCESS CALORIES WITH SERIOUS COMORBIDITY AND BODY MASS INDEX (BMI) OF 40.0 TO 44.9 IN ADULT: ICD-10-CM

## 2018-09-11 DIAGNOSIS — I10 ESSENTIAL HYPERTENSION: ICD-10-CM

## 2018-09-11 DIAGNOSIS — E78.2 MIXED HYPERLIPIDEMIA: ICD-10-CM

## 2018-09-11 DIAGNOSIS — I25.709 CORONARY ARTERY DISEASE INVOLVING CORONARY BYPASS GRAFT OF NATIVE HEART WITH ANGINA PECTORIS (HCC): Primary | ICD-10-CM

## 2018-09-11 DIAGNOSIS — Z95.810 AICD (AUTOMATIC CARDIOVERTER/DEFIBRILLATOR) PRESENT: ICD-10-CM

## 2018-09-11 DIAGNOSIS — I48.20 CHRONIC ATRIAL FIBRILLATION (HCC): ICD-10-CM

## 2018-09-11 PROCEDURE — 93295 DEV INTERROG REMOTE 1/2/MLT: CPT | Performed by: PHYSICIAN ASSISTANT

## 2018-09-11 PROCEDURE — 93296 REM INTERROG EVL PM/IDS: CPT | Performed by: PHYSICIAN ASSISTANT

## 2018-09-11 PROCEDURE — 93000 ELECTROCARDIOGRAM COMPLETE: CPT | Performed by: NURSE PRACTITIONER

## 2018-09-11 PROCEDURE — 99214 OFFICE O/P EST MOD 30 MIN: CPT | Performed by: NURSE PRACTITIONER

## 2018-09-11 RX ORDER — ALLOPURINOL 100 MG/1
100 TABLET ORAL DAILY
COMMUNITY

## 2018-09-11 NOTE — PATIENT INSTRUCTIONS
Obesity, Adult  Obesity is the condition of having too much total body fat. Being overweight or obese means that your weight is greater than what is considered healthy for your body size. Obesity is determined by a measurement called BMI. BMI is an estimate of body fat and is calculated from height and weight. For adults, a BMI of 30 or higher is considered obese.  Obesity can eventually lead to other health concerns and major illnesses, including:  · Stroke.  · Coronary artery disease (CAD).  · Type 2 diabetes.  · Some types of cancer, including cancers of the colon, breast, uterus, and gallbladder.  · Osteoarthritis.  · High blood pressure (hypertension).  · High cholesterol.  · Sleep apnea.  · Gallbladder stones.  · Infertility problems.    What are the causes?  The main cause of obesity is taking in (consuming) more calories than your body uses for energy. Other factors that contribute to this condition may include:  · Being born with genes that make you more likely to become obese.  · Having a medical condition that causes obesity. These conditions include:  ? Hypothyroidism.  ? Polycystic ovarian syndrome (PCOS).  ? Binge-eating disorder.  ? Cushing syndrome.  · Taking certain medicines, such as steroids, antidepressants, and seizure medicines.  · Not being physically active (sedentary lifestyle).  · Living where there are limited places to exercise safely or buy healthy foods.  · Not getting enough sleep.    What increases the risk?  The following factors may increase your risk of this condition:  · Having a family history of obesity.  · Being a woman of -American descent.  · Being a man of  descent.    What are the signs or symptoms?  Having excessive body fat is the main symptom of this condition.  How is this diagnosed?  This condition may be diagnosed based on:  · Your symptoms.  · Your medical history.  · A physical exam. Your health care provider may measure:  ? Your BMI. If you are an  adult with a BMI between 25 and less than 30, you are considered overweight. If you are an adult with a BMI of 30 or higher, you are considered obese.  ? The distances around your hips and your waist (circumferences). These may be compared to each other to help diagnose your condition.  ? Your skinfold thickness. Your health care provider may gently pinch a fold of your skin and measure it.    How is this treated?  Treatment for this condition often includes changing your lifestyle. Treatment may include some or all of the following:  · Dietary changes. Work with your health care provider and a dietitian to set a weight-loss goal that is healthy and reasonable for you. Dietary changes may include eating:  ? Smaller portions. A portion size is the amount of a particular food that is healthy for you to eat at one time. This varies from person to person.  ? Low-calorie or low-fat options.  ? More whole grains, fruits, and vegetables.  · Regular physical activity. This may include aerobic activity (cardio) and strength training.  · Medicine to help you lose weight. Your health care provider may prescribe medicine if you are unable to lose 1 pound a week after 6 weeks of eating more healthily and doing more physical activity.  · Surgery. Surgical options may include gastric banding and gastric bypass. Surgery may be done if:  ? Other treatments have not helped to improve your condition.  ? You have a BMI of 40 or higher.  ? You have life-threatening health problems related to obesity.    Follow these instructions at home:    Eating and drinking    · Follow recommendations from your health care provider about what you eat and drink. Your health care provider may advise you to:  ? Limit fast foods, sweets, and processed snack foods.  ? Choose low-fat options, such as low-fat milk instead of whole milk.  ? Eat 5 or more servings of fruits or vegetables every day.  ? Eat at home more often. This gives you more control over  what you eat.  ? Choose healthy foods when you eat out.  ? Learn what a healthy portion size is.  ? Keep low-fat snacks on hand.  ? Avoid sugary drinks, such as soda, fruit juice, iced tea sweetened with sugar, and flavored milk.  ? Eat a healthy breakfast.  · Drink enough water to keep your urine clear or pale yellow.  · Do not go without eating for long periods of time (do not fast) or follow a fad diet. Fasting and fad diets can be unhealthy and even dangerous.  Physical Activity  · Exercise regularly, as told by your health care provider. Ask your health care provider what types of exercise are safe for you and how often you should exercise.  · Warm up and stretch before being active.  · Cool down and stretch after being active.  · Rest between periods of activity.  Lifestyle  · Limit the time that you spend in front of your TV, computer, or video game system.  · Find ways to reward yourself that do not involve food.  · Limit alcohol intake to no more than 1 drink a day for nonpregnant women and 2 drinks a day for men. One drink equals 12 oz of beer, 5 oz of wine, or 1½ oz of hard liquor.  General instructions  · Keep a weight loss journal to keep track of the food you eat and how much you exercise you get.  · Take over-the-counter and prescription medicines only as told by your health care provider.  · Take vitamins and supplements only as told by your health care provider.  · Consider joining a support group. Your health care provider may be able to recommend a support group.  · Keep all follow-up visits as told by your health care provider. This is important.  Contact a health care provider if:  · You are unable to meet your weight loss goal after 6 weeks of dietary and lifestyle changes.  This information is not intended to replace advice given to you by your health care provider. Make sure you discuss any questions you have with your health care provider.  Document Released: 01/25/2006 Document Revised:  "05/22/2017 Document Reviewed: 10/05/2016  [employer] Interactive Patient Education © 2018 [employer]    Heart-Healthy Eating Plan  Heart-healthy meal planning includes:  · Limiting unhealthy fats.  · Increasing healthy fats.  · Making other small dietary changes.    You may need to talk with your doctor or a diet specialist (dietitian) to create an eating plan that is right for you.  What types of fat should I choose?  · Choose healthy fats. These include olive oil and canola oil, flaxseeds, walnuts, almonds, and seeds.  · Eat more omega-3 fats. These include salmon, mackerel, sardines, tuna, flaxseed oil, and ground flaxseeds. Try to eat fish at least twice each week.  · Limit saturated fats.  ? Saturated fats are often found in animal products, such as meats, butter, and cream.  ? Plant sources of saturated fats include palm oil, palm kernel oil, and coconut oil.  · Avoid foods with partially hydrogenated oils in them. These include stick margarine, some tub margarines, cookies, crackers, and other baked goods. These contain trans fats.  What general guidelines do I need to follow?  · Check food labels carefully. Identify foods with trans fats or high amounts of saturated fat.  · Fill one half of your plate with vegetables and green salads. Eat 4-5 servings of vegetables per day. A serving of vegetables is:  ? 1 cup of raw leafy vegetables.  ? ½ cup of raw or cooked cut-up vegetables.  ? ½ cup of vegetable juice.  · Fill one fourth of your plate with whole grains. Look for the word \"whole\" as the first word in the ingredient list.  · Fill one fourth of your plate with lean protein foods.  · Eat 4-5 servings of fruit per day. A serving of fruit is:  ? One medium whole fruit.  ? ¼ cup of dried fruit.  ? ½ cup of fresh, frozen, or canned fruit.  ? ½ cup of 100% fruit juice.  · Eat more foods that contain soluble fiber. These include apples, broccoli, carrots, beans, peas, and barley. Try to get 20-30 g of fiber per " day.  · Eat more home-cooked food. Eat less restaurant, buffet, and fast food.  · Limit or avoid alcohol.  · Limit foods high in starch and sugar.  · Avoid fried foods.  · Avoid frying your food. Try baking, boiling, grilling, or broiling it instead. You can also reduce fat by:  ? Removing the skin from poultry.  ? Removing all visible fats from meats.  ? Skimming the fat off of stews, soups, and gravies before serving them.  ? Steaming vegetables in water or broth.  · Lose weight if you are overweight.  · Eat 4-5 servings of nuts, legumes, and seeds per week:  ? One serving of dried beans or legumes equals ½ cup after being cooked.  ? One serving of nuts equals 1½ ounces.  ? One serving of seeds equals ½ ounce or one tablespoon.  · You may need to keep track of how much salt or sodium you eat. This is especially true if you have high blood pressure. Talk with your doctor or dietitian to get more information.  What foods can I eat?  Grains  Breads, including Yi, white, hannah, wheat, raisin, rye, oatmeal, and Italian. Tortillas that are neither fried nor made with lard or trans fat. Low-fat rolls, including hotdog and hamburger buns and English muffins. Biscuits. Muffins. Waffles. Pancakes. Light popcorn. Whole-grain cereals. Flatbread. [place] toast. Pretzels. Breadsticks. Rusks. Low-fat snacks. Low-fat crackers, including oyster, saltine, matzo, marianna, animal, and rye. Rice and pasta, including brown rice and pastas that are made with whole wheat.  Vegetables  All vegetables.  Fruits  All fruits, but limit coconut.  Meats and Other Protein Sources  Lean, well-trimmed beef, veal, pork, and lamb. Chicken and turkey without skin. All fish and shellfish. Wild duck, rabbit, pheasant, and venison. Egg whites or low-cholesterol egg substitutes. Dried beans, peas, lentils, and tofu. Seeds and most nuts.  Dairy  Low-fat or nonfat cheeses, including ricotta, string, and mozzarella. Skim or 1% milk that is liquid, powdered,  or evaporated. Buttermilk that is made with low-fat milk. Nonfat or low-fat yogurt.  Beverages  Mineral water. Diet carbonated beverages.  Sweets and Desserts  Sherbets and fruit ices. Honey, jam, marmalade, jelly, and syrups. Meringues and gelatins. Pure sugar candy, such as hard candy, jelly beans, gumdrops, mints, marshmallows, and small amounts of dark chocolate. Peter food cake.  Eat all sweets and desserts in moderation.  Fats and Oils  Nonhydrogenated (trans-free) margarines. Vegetable oils, including soybean, sesame, sunflower, olive, peanut, safflower, corn, canola, and cottonseed. Salad dressings or mayonnaise made with a vegetable oil. Limit added fats and oils that you use for cooking, baking, salads, and as spreads.  Other  Cocoa powder. Coffee and tea. All seasonings and condiments.  The items listed above may not be a complete list of recommended foods or beverages. Contact your dietitian for more options.  What foods are not recommended?  Grains  Breads that are made with saturated or trans fats, oils, or whole milk. Croissants. Butter rolls. Cheese breads. Sweet rolls. Donuts. Buttered popcorn. Chow mein noodles. High-fat crackers, such as cheese or butter crackers.  Meats and Other Protein Sources  Fatty meats, such as hotdogs, short ribs, sausage, spareribs, oneal, rib eye roast or steak, and mutton. High-fat deli meats, such as salami and bologna. Caviar. Domestic duck and goose. Organ meats, such as kidney, liver, sweetbreads, and heart.  Dairy  Cream, sour cream, cream cheese, and creamed cottage cheese. Whole-milk cheeses, including blue (hari), [place], Brie, [place], American, Havarti, Swiss, cheddar, Camembert, and [place]. Whole or 2% milk that is liquid, evaporated, or condensed. Whole buttermilk. Cream sauce or high-fat cheese sauce. Yogurt that is made from whole milk.  Beverages  Regular sodas and juice drinks with added sugar.  Sweets and Desserts  Frosting. Pudding. Cookies.  Cakes other than sakina food cake. Candy that has milk chocolate or white chocolate, hydrogenated fat, butter, coconut, or unknown ingredients. Buttered syrups. Full-fat ice cream or ice cream drinks.  Fats and Oils  Gravy that has suet, meat fat, or shortening. Cocoa butter, hydrogenated oils, palm oil, coconut oil, palm kernel oil. These can often be found in baked products, candy, fried foods, nondairy creamers, and whipped toppings. Solid fats and shortenings, including oneal fat, salt pork, lard, and butter. Nondairy cream substitutes, such as coffee creamers and sour cream substitutes. Salad dressings that are made of unknown oils, cheese, or sour cream.  The items listed above may not be a complete list of foods and beverages to avoid. Contact your dietitian for more information.  This information is not intended to replace advice given to you by your health care provider. Make sure you discuss any questions you have with your health care provider.  Document Released: 06/18/2013 Document Revised: 05/25/2017 Document Reviewed: 06/11/2015  [employer] Interactive Patient Education © 2018 [employer]    Exercising to Lose Weight  Exercising can help you to lose weight. In order to lose weight through exercise, you need to do vigorous-intensity exercise. You can tell that you are exercising with vigorous intensity if you are breathing very hard and fast and cannot hold a conversation while exercising.  Moderate-intensity exercise helps to maintain your current weight. You can tell that you are exercising at a moderate level if you have a higher heart rate and faster breathing, but you are still able to hold a conversation.  How often should I exercise?  Choose an activity that you enjoy and set realistic goals. Your health care provider can help you to make an activity plan that works for you. Exercise regularly as directed by your health care provider. This may include:  · Doing resistance training twice each  week, such as:  ? Push-ups.  ? Sit-ups.  ? Lifting weights.  ? Using resistance bands.  · Doing a given intensity of exercise for a given amount of time. Choose from these options:  ? 150 minutes of moderate-intensity exercise every week.  ? 75 minutes of vigorous-intensity exercise every week.  ? A mix of moderate-intensity and vigorous-intensity exercise every week.    Children, pregnant women, people who are out of shape, people who are overweight, and older adults may need to consult a health care provider for individual recommendations. If you have any sort of medical condition, be sure to consult your health care provider before starting a new exercise program.  What are some activities that can help me to lose weight?  · Walking at a rate of at least 4.5 miles an hour.  · Jogging or running at a rate of 5 miles per hour.  · Biking at a rate of at least 10 miles per hour.  · Lap swimming.  · Roller-skating or in-line skating.  · Cross-country skiing.  · Vigorous competitive sports, such as football, basketball, and soccer.  · Jumping rope.  · Aerobic dancing.  How can I be more active in my day-to-day activities?  · Use the stairs instead of the elevator.  · Take a walk during your lunch break.  · If you drive, park your car farther away from work or school.  · If you take public transportation, get off one stop early and walk the rest of the way.  · Make all of your phone calls while standing up and walking around.  · Get up, stretch, and walk around every 30 minutes throughout the day.  What guidelines should I follow while exercising?  · Do not exercise so much that you hurt yourself, feel dizzy, or get very short of breath.  · Consult your health care provider prior to starting a new exercise program.  · Wear comfortable clothes and shoes with good support.  · Drink plenty of water while you exercise to prevent dehydration or heat stroke. Body water is lost during exercise and must be replaced.  · Work out  until you breathe faster and your heart beats faster.  This information is not intended to replace advice given to you by your health care provider. Make sure you discuss any questions you have with your health care provider.  Document Released: 01/20/2012 Document Revised: 05/25/2017 Document Reviewed: 05/21/2015  [employer] Interactive Patient Education © 2018 [employer]

## 2018-09-11 NOTE — PROGRESS NOTES
"    Subjective:     Encounter Date:09/11/2018      Patient ID: Cary Shah is a 78 y.o. female. She presents today for three month follow up of NYHA class III chronic systolic congestive heart failure s/p AICD/pacemaker. She has a history of coronary artery disease s/p multiple coronary artery stents and subsequent coronary artery bypass grafting X2 in 2003, chronic atrial fibrillation not on anticoagulation due to history of vaginal bleeding on Pradaxa, hyperlipidemia, type 2 diabetes mellitus and obesity. She continues to complain of dyspnea with mild exertion and shortness of breath and bilateral lower extremity edema. She reports intermittent episodes of left sided chest discomfort that occur after eating and are relieved with belching. This discomfort is the same she was experiencing prior to negative dobutamine stress echo 5/18. She denies palpitations, dizziness, syncope, orthopnea or PND. She does report decreased stamina related to shortness of breath. Pt refused Watchman at last visit with Dr. Gandhi. Discussed management of heart failure at length including weighing daily, reporting weight gain and restricting sodium intake. Pt refuses repeat 2D echo and Entresto at this time. She states \"we can talk about it the next time I see you\".     Chief Complaint: shortness of breath, edema  Congestive Heart Failure   Presents for follow-up visit. Associated symptoms include edema and shortness of breath. Pertinent negatives include no abdominal pain, chest pain, chest pressure, claudication, fatigue, muscle weakness, near-syncope, nocturia, orthopnea, palpitations, paroxysmal nocturnal dyspnea or unexpected weight change. The symptoms have been worsening.   Atrial Fibrillation   Presents for follow-up visit. Symptoms include shortness of breath. Symptoms are negative for an AICD problem, bradycardia, chest pain, dizziness, hemodynamic instability, hypertension, hypotension, pacemaker problem, palpitations, " syncope, tachycardia and weakness. The symptoms have been stable. Past medical history includes atrial fibrillation and CHF.   Coronary Artery Disease   Presents for follow-up visit. Symptoms include leg swelling and shortness of breath. Pertinent negatives include no chest pain, chest pressure, chest tightness, dizziness, muscle weakness, palpitations or weight gain. Risk factors do not include hypertension. Her past medical history is significant for CHF. The symptoms have been stable. Compliance with diet is variable. Compliance with exercise is variable. Compliance with medications is good.       The following portions of the patient's history were reviewed and updated as appropriate: allergies, current medications, past family history, past medical history, past social history, past surgical history and problem list.     Allergies   Allergen Reactions   • Bactrim [Sulfamethoxazole-Trimethoprim] Palpitations   • Levaquin [Levofloxacin] Palpitations       Current Outpatient Prescriptions:   •  allopurinol (ZYLOPRIM) 100 MG tablet, Take 100 mg by mouth Daily., Disp: , Rfl:   •  aspirin  MG tablet, Take 325 mg by mouth Daily., Disp: , Rfl:   •  Camphor-Eucalyptus-Menthol (VICKS VAPORUB EX), Apply  topically., Disp: , Rfl:   •  DIGOX 250 MCG tablet, TAKE 1 TABLET BY MOUTH DAILY, Disp: 90 tablet, Rfl: 0  •  felodipine (PLENDIL) 5 MG 24 hr tablet, Take 5 mg by mouth Daily., Disp: , Rfl:   •  furosemide (LASIX) 40 MG tablet, Take 40 mg by mouth 2 (Two) Times a Day., Disp: , Rfl:   •  gabapentin (NEURONTIN) 100 MG capsule, Take 1 capsule by mouth 3 (Three) Times a Day., Disp: 100 capsule, Rfl: 11  •  guaiFENesin (MUCINEX) 600 MG 12 hr tablet, Take 1,200 mg by mouth 2 (Two) Times a Day., Disp: , Rfl:   •  HYDROcodone-acetaminophen (NORCO) 7.5-325 MG per tablet, Take 1 tablet by mouth Every 6 (Six) Hours As Needed for Moderate Pain (4-6)., Disp: , Rfl:   •  insulin glargine (LANTUS) 100 UNIT/ML injection, Inject   under the skin Daily., Disp: , Rfl:   •  Insulin Lispro (HUMALOG) 100 UNIT/ML solution cartridge, Inject  under the skin., Disp: , Rfl:   •  levothyroxine (SYNTHROID, LEVOTHROID) 175 MCG tablet, Take 175 mcg by mouth Daily., Disp: , Rfl:   •  metoprolol succinate XL (TOPROL-XL) 25 MG 24 hr tablet, Take 1 tablet by mouth Daily., Disp: 90 tablet, Rfl: 3  •  olmesartan (BENICAR) 40 MG tablet, TAKE 1 TABLET BY MOUTH DAILY, Disp: 90 tablet, Rfl: 3  •  pantoprazole (PROTONIX) 40 MG EC tablet, Take 40 mg by mouth Daily., Disp: , Rfl:   •  spironolactone (ALDACTONE) 25 MG tablet, Take 1 tablet by mouth Daily., Disp: 90 tablet, Rfl: 3  •  vitamin D (ERGOCALCIFEROL) 92495 units capsule capsule, Take 50,000 Units by mouth 1 (One) Time Per Week., Disp: , Rfl:   •  vitamin E 400 UNIT capsule, Take 400 Units by mouth Daily., Disp: , Rfl:   Past Medical History:   Diagnosis Date   • AICD present, double chamber    • Arteriosclerosis of coronary artery bypass graft     stents and CABG   • Atherosclerosis of native coronary artery with angina pectoris (CMS/HCC)    • Back pain, chronic    • Cardiac device in situ     Cardiac device in situ (OPTIVOL REMOTE)   • Cardiac device in situ     Cardiac device in situ, other   • Chest pain    • Chronic atrial fibrillation (CMS/HCC)    • Chronic kidney disease due to diabetes mellitus (CMS/HCC)    • Chronic systolic heart failure (CMS/HCC)     EF 45-50% 11/2014 echo   • Diabetes mellitus due to underlying condition with complications (CMS/HCC)    • Diabetes mellitus type 2, uncontrolled, without complications (CMS/HCC)    • Gout    • H/O acute myocardial infarction    • History of coronary artery bypass graft     Coronary Artery Bypass, Two   • Hyperlipidemia    • Hypothyroidism    • Ischemic cardiomyopathy    • Malignant hypertension     better control   • Myocardial infarction    • Non-compliance    • Shortness of breath    • Shoulder pain, left    • Sleep apnea     not officially diagnosed    • Snoring    • Ventricular fibrillation (CMS/HCC)      Past Surgical History:   Procedure Laterality Date   • CARDIAC CATHETERIZATION     • CARDIAC DEFIBRILLATOR PLACEMENT     • CATARACT EXTRACTION     • CORONARY ARTERY BYPASS GRAFT     • CORONARY STENT PLACEMENT     • INSERT / REPLACE / REMOVE PACEMAKER     • OTHER SURGICAL HISTORY      sweat gland removed   • OTHER SURGICAL HISTORY      reconstructive surgery after a spider bite   • TOTAL THYROIDECTOMY     • TUBAL ABDOMINAL LIGATION       Family History   Problem Relation Age of Onset   • Coronary artery disease Mother    • Heart attack Mother    • Coronary artery disease Father    • Heart attack Father    • Heart disease Sister    • Heart attack Sister    • Coronary artery disease Sister    • Heart attack Brother    • Heart disease Brother    • Coronary artery disease Brother    • No Known Problems Maternal Grandmother    • No Known Problems Maternal Grandfather    • No Known Problems Paternal Grandmother    • No Known Problems Paternal Grandfather    • Heart disease Brother    • Heart disease Brother    • Heart attack Brother      Social History     Social History   • Marital status: Single     Spouse name: N/A   • Number of children: N/A   • Years of education: N/A     Occupational History   • Not on file.     Social History Main Topics   • Smoking status: Current Some Day Smoker     Packs/day: 0.25     Types: Cigarettes     Start date: 1960   • Smokeless tobacco: Never Used      Comment: 1 cigarette every now and then when gets a craving for one.  does not want to quit yet.   • Alcohol use No   • Drug use: No   • Sexual activity: Defer     Other Topics Concern   • Not on file     Social History Narrative   • No narrative on file         Review of Systems   Constitution: Negative for chills, decreased appetite, fatigue, fever, weakness, malaise/fatigue, night sweats, unexpected weight change, weight gain and weight loss.   HENT: Negative for nosebleeds.   "  Eyes: Negative for visual disturbance.   Cardiovascular: Positive for dyspnea on exertion and leg swelling. Negative for chest pain, claudication, near-syncope, orthopnea, palpitations, paroxysmal nocturnal dyspnea and syncope.   Respiratory: Positive for shortness of breath. Negative for chest tightness, cough, hemoptysis, snoring and wheezing.    Endocrine: Negative for cold intolerance and heat intolerance.   Hematologic/Lymphatic: Does not bruise/bleed easily.   Skin: Negative for rash.   Musculoskeletal: Negative for back pain, falls and muscle weakness.   Gastrointestinal: Negative for abdominal pain, change in bowel habit, constipation, diarrhea, dysphagia, heartburn, nausea and vomiting.   Genitourinary: Negative for hematuria and nocturia.   Neurological: Negative for dizziness, headaches and light-headedness.   Psychiatric/Behavioral: Negative for altered mental status.   Allergic/Immunologic: Negative for persistent infections.         ECG 12 Lead  Date/Time: 9/11/2018 9:20 AM  Performed by: PRASANTH WILD  Authorized by: PRASANTH WILD   Comparison: compared with previous ECG from 9/11/2018  Similar to previous ECG  Rhythm: atrial fibrillation and paced  Rate: normal  BPM: 91            /80 (BP Location: Right arm, Patient Position: Sitting, Cuff Size: Adult)   Pulse 91   Resp 18   Ht 160 cm (63\")   Wt 105 kg (232 lb)   SpO2 97%   Breastfeeding? No   BMI 41.10 kg/m²        Objective:     Physical Exam   Constitutional: She is oriented to person, place, and time. Vital signs are normal. She appears well-developed and well-nourished. No distress.   HENT:   Head: Normocephalic and atraumatic.   Right Ear: External ear normal.   Left Ear: External ear normal.   Nose: Nose normal.   Eyes: Pupils are equal, round, and reactive to light. Conjunctivae are normal. Right eye exhibits no discharge. Left eye exhibits no discharge.   Neck: Normal range of motion. Neck supple. No JVD present. Carotid " bruit is not present. No tracheal deviation present. No thyromegaly present.   Cardiovascular: Normal rate, normal heart sounds, intact distal pulses and normal pulses.  An irregularly irregular rhythm present. PMI is not displaced.  Exam reveals no gallop and no friction rub.    No murmur heard.  Pulses:       Radial pulses are 2+ on the right side, and 2+ on the left side.        Dorsalis pedis pulses are 2+ on the right side, and 2+ on the left side.        Posterior tibial pulses are 2+ on the right side, and 2+ on the left side.   Pulmonary/Chest: Effort normal and breath sounds normal. No respiratory distress. She has no decreased breath sounds. She has no wheezes. She has no rhonchi. She has no rales. She exhibits no tenderness.   Abdominal: Soft. She exhibits no distension. There is no tenderness.   Musculoskeletal: Normal range of motion. She exhibits edema (2+ pitting edema in bilateral lower extremities). She exhibits no tenderness or deformity.   Neurological: She is alert and oriented to person, place, and time.   Skin: Skin is warm and dry. No rash noted. She is not diaphoretic. There is erythema (bilateral lower extremities). No pallor.   Psychiatric: She has a normal mood and affect. Her behavior is normal. Judgment and thought content normal.   Vitals reviewed.        Assessment:          Diagnosis Plan   1. Coronary artery disease involving coronary bypass graft of native heart with angina pectoris  Atypical chest pain, likely GI related. No change since negative dobutamine stress echo 5/18.    2. Status post coronary artery stent placement     3. S/P CABG x 2  Continues on aspirin.    4. Chronic atrial fibrillation  Rate controlled. No DOAC due to history of vaginal bleeding on Pradaxa. Refuses Watchman.    5. History of vaginal bleeding     6. NYHA class III Chronic systolic congestive heart failure  EF 45-50% on last echo in 2014. Refuses repeat echo for guidance in medication therapy and  potential Entresto initiation.    7. Mixed hyperlipidemia  Pt stopped statin therapy due to myalgias. Managed by PCP.    8. Type 2 diabetes mellitus with complication, with long-term current use of insulin  Managed by PCP.    9. Class 3 obesity due to excess calories with serious comorbidity and body mass index (BMI) of 40.0 to 44.9 in adult  Patient's Body mass index is 41.1 kg/m². BMI is above normal parameters. Follow-up plan includes:  educational material.     10.    AICD present            Interrogated 5/16/18. 0% A-paced, 39.3% V- paced. 98.7% Afib burden.                Normal function. No therapies delivered, adequate battery reserve.  11.    Hypertension            Elevated. Pt refuses medication adjustments at this time.      Plan:       1. Continue medications as previously prescribed.  2. Report any worsening symptoms.  3. Report any signs of bleeding.  4. Continue heart healthy diet and regular exercise as tolerated.   5. Follow up with PCP for blood pressure and cholesterol management and routine lab work.  6. Follow up with CHF clinic in 3 months, or sooner if needed. Pt to call sooner if she decides to follow through with repeat echo and Entresto.   7. Reviewed signs and symptoms of CHF and what to report with the patient. Patient instructed to restrict sodium and weigh daily. Report weight gain of greater than 2 lbs overnight or 5 lbs in 1 week. Pt verbalized understanding of instructions and plan of care.   8.  Monitor and record daily blood pressure. Report readings consistently higher than 140/90 or consistently lower than 100/60.

## 2018-09-12 NOTE — PROGRESS NOTES
Dual Chamber AICD Evaluation Report  [facility]    September 12, 2018    Primary Cardiologist: Hiram  : Medtronic Model: Evera  Implant date: 5/28/15    Reason for evaluation: routine  Indication for AICD: chronic a-fib and chronic systolic congestive heart failure    Measurements  Atrial sensing - P wave: 0.3 mV  Atrial threshold: n/r  Atrial lead impedance: 380 ohms  Ventricular sensing - R wave: 4.3 mV  Ventricular threshold: 1.125 V @ 0.4 ms  Ventricular lead impedance:  703 ohms  Shock impedance:  RV- 41 ohms   SVC- 57 ohms      Diagnostic Data  Atrial paced: 0 %  Ventricular paced: 27.3 %  Other: Chronic AF.  Rate controlled.  No anticoagulation d/t h/o vaginal bleeding.  Battery status: satisfactory        Final Parameters  Mode: VVIR  Lower rate: 60 bpm Upper rate: 120 bpm     Atrial - Sensitivity: 0.15 mV   Ventricular - Amplitude: 2.5 V Pulse width: 0.4 ms Sensitivity: 0.3 mV   Changes made: none  Conclusions: normal AICD function    Follow up: 3 months

## 2018-12-12 ENCOUNTER — CLINICAL SUPPORT (OUTPATIENT)
Dept: CARDIOLOGY | Facility: CLINIC | Age: 78
End: 2018-12-12

## 2018-12-12 DIAGNOSIS — I50.22 CHRONIC SYSTOLIC CONGESTIVE HEART FAILURE (HCC): ICD-10-CM

## 2018-12-12 PROCEDURE — 93296 REM INTERROG EVL PM/IDS: CPT | Performed by: PHYSICIAN ASSISTANT

## 2018-12-12 PROCEDURE — 93295 DEV INTERROG REMOTE 1/2/MLT: CPT | Performed by: PHYSICIAN ASSISTANT

## 2018-12-12 NOTE — PROGRESS NOTES
Dual Chamber AICD Evaluation Report  [facility]    December 12, 2018    Primary Cardiologist: Hiram  : Medtronic Model: Evera  Implant date: 5/28/15    Reason for evaluation: routine  Indication for AICD: chronic a-fib and chronic systolic congestive heart failure    Measurements  Atrial sensing - P wave: 0.1 mV  Atrial threshold: n/r  Atrial lead impedance: 437 ohms  Ventricular sensing - R wave: 0.3 mV  Ventricular threshold: 1.25 V @ 0.4 ms  Ventricular lead impedance:  703 ohms  Shock impedance:  RV- 45 ohms   SVC- 51 ohms      Diagnostic Data  Atrial paced: 0 %  Ventricular paced: 33.5 %  Other: Multiple episodes of AF noted.  V rate mostly controlled.  AF burden 78.7%.  Pt is not anticoagulated d/t h/o vaginal bleeding.  She is on ASA.  Battery status: satisfactory        Final Parameters  Mode: VVIR  Lower rate: 60 bpm Upper rate: 120 bpm    Atrial - Sensitivity: 0.15 mV   Ventricular - Amplitude: 2.75 V Pulse width: 0.4 ms Sensitivity: 0.3 mV   Changes made: n/a  Conclusions: normal AICD function    Follow up: 3 months

## 2019-01-15 RX ORDER — DIGOXIN 250 MCG
250 TABLET ORAL DAILY
Qty: 90 TABLET | Refills: 3 | Status: SHIPPED | OUTPATIENT
Start: 2019-01-15 | End: 2020-02-04 | Stop reason: SDUPTHER

## 2019-01-17 RX ORDER — DIGOXIN 250 UG/1
TABLET ORAL
Qty: 90 TABLET | Refills: 0 | Status: SHIPPED | OUTPATIENT
Start: 2019-01-17 | End: 2019-02-05

## 2019-02-05 ENCOUNTER — OFFICE VISIT (OUTPATIENT)
Dept: CARDIOLOGY | Facility: CLINIC | Age: 79
End: 2019-02-05

## 2019-02-05 VITALS
HEART RATE: 94 BPM | SYSTOLIC BLOOD PRESSURE: 112 MMHG | DIASTOLIC BLOOD PRESSURE: 62 MMHG | BODY MASS INDEX: 40.04 KG/M2 | WEIGHT: 226 LBS | HEIGHT: 63 IN

## 2019-02-05 DIAGNOSIS — I10 ESSENTIAL HYPERTENSION: ICD-10-CM

## 2019-02-05 DIAGNOSIS — I48.20 CHRONIC ATRIAL FIBRILLATION (HCC): ICD-10-CM

## 2019-02-05 DIAGNOSIS — E66.01 CLASS 3 SEVERE OBESITY DUE TO EXCESS CALORIES WITH SERIOUS COMORBIDITY AND BODY MASS INDEX (BMI) OF 40.0 TO 44.9 IN ADULT (HCC): ICD-10-CM

## 2019-02-05 DIAGNOSIS — N18.4 STAGE 4 CHRONIC KIDNEY DISEASE (HCC): ICD-10-CM

## 2019-02-05 DIAGNOSIS — Z79.4 TYPE 2 DIABETES MELLITUS WITH COMPLICATION, WITH LONG-TERM CURRENT USE OF INSULIN (HCC): ICD-10-CM

## 2019-02-05 DIAGNOSIS — Z95.810 AICD (AUTOMATIC CARDIOVERTER/DEFIBRILLATOR) PRESENT: ICD-10-CM

## 2019-02-05 DIAGNOSIS — E11.8 TYPE 2 DIABETES MELLITUS WITH COMPLICATION, WITH LONG-TERM CURRENT USE OF INSULIN (HCC): ICD-10-CM

## 2019-02-05 DIAGNOSIS — Z95.1 S/P CABG X 2: ICD-10-CM

## 2019-02-05 DIAGNOSIS — Z95.5 STATUS POST CORONARY ARTERY STENT PLACEMENT: ICD-10-CM

## 2019-02-05 DIAGNOSIS — E78.2 MIXED HYPERLIPIDEMIA: ICD-10-CM

## 2019-02-05 DIAGNOSIS — I50.22 CHF (CONGESTIVE HEART FAILURE), NYHA CLASS III, CHRONIC, SYSTOLIC (HCC): ICD-10-CM

## 2019-02-05 DIAGNOSIS — Z87.42 HISTORY OF VAGINAL BLEEDING: ICD-10-CM

## 2019-02-05 DIAGNOSIS — I25.709 CORONARY ARTERY DISEASE INVOLVING CORONARY BYPASS GRAFT OF NATIVE HEART WITH ANGINA PECTORIS (HCC): Primary | ICD-10-CM

## 2019-02-05 DIAGNOSIS — L03.119 CELLULITIS OF LOWER EXTREMITY, UNSPECIFIED LATERALITY: ICD-10-CM

## 2019-02-05 PROBLEM — E66.813 CLASS 3 SEVERE OBESITY DUE TO EXCESS CALORIES WITH SERIOUS COMORBIDITY AND BODY MASS INDEX (BMI) OF 40.0 TO 44.9 IN ADULT: Status: ACTIVE | Noted: 2018-04-26

## 2019-02-05 PROCEDURE — 99214 OFFICE O/P EST MOD 30 MIN: CPT | Performed by: NURSE PRACTITIONER

## 2019-02-05 PROCEDURE — 93000 ELECTROCARDIOGRAM COMPLETE: CPT | Performed by: NURSE PRACTITIONER

## 2019-02-05 NOTE — PROGRESS NOTES
Subjective:     Encounter Date:02/05/2019      Patient ID: Cary Shah is a 78 y.o. female. She presents today for three month follow up of NYHA class III chronic systolic congestive heart failure s/p AICD/pacemaker. She has a history of coronary artery disease s/p multiple coronary artery stents and subsequent coronary artery bypass grafting X2 in 2003, chronic atrial fibrillation not on anticoagulation due to history of vaginal bleeding on Pradaxa, hyperlipidemia, type 2 diabetes mellitus and obesity. She continues to complain of dyspnea with mild exertion and shortness of breath, orthopnea and bilateral lower extremity edema with erythema. She reports intermittent episodes of left sided chest discomfort that occur after eating and are relieved with belching. This discomfort is the same she was experiencing prior to negative dobutamine stress echo 5/18. She denies palpitations, dizziness, syncope or PND. She does report decreased stamina related to shortness of breath. Pt has refused Watchman in the past.     Chief Complaint: routine follow up  Coronary Artery Disease   Presents for follow-up visit. Symptoms include leg swelling and shortness of breath. Pertinent negatives include no chest pain, chest pressure, chest tightness, dizziness, muscle weakness, palpitations or weight gain. Risk factors do not include hypertension. Her past medical history is significant for CHF. The symptoms have been stable. Compliance with diet is variable. Compliance with exercise is variable. Compliance with medications is good.   Congestive Heart Failure   Presents for follow-up visit. Associated symptoms include edema, orthopnea and shortness of breath. Pertinent negatives include no abdominal pain, chest pain, chest pressure, claudication, fatigue, muscle weakness, near-syncope, nocturia, palpitations, paroxysmal nocturnal dyspnea or unexpected weight change. The symptoms have been worsening. Her past medical history is  significant for CAD.   Atrial Fibrillation   Presents for follow-up visit. Symptoms include shortness of breath. Symptoms are negative for an AICD problem, bradycardia, chest pain, dizziness, hemodynamic instability, hypertension, hypotension, pacemaker problem, palpitations, syncope, tachycardia and weakness. The symptoms have been stable. Past medical history includes atrial fibrillation, CAD and CHF.       The following portions of the patient's history were reviewed and updated as appropriate: allergies, current medications, past family history, past medical history, past social history, past surgical history and problem list.     Allergies   Allergen Reactions   • Bactrim [Sulfamethoxazole-Trimethoprim] Palpitations   • Levaquin [Levofloxacin] Palpitations       Current Outpatient Medications:   •  allopurinol (ZYLOPRIM) 100 MG tablet, Take 100 mg by mouth Daily., Disp: , Rfl:   •  aspirin  MG tablet, Take 325 mg by mouth Daily., Disp: , Rfl:   •  Camphor-Eucalyptus-Menthol (VICKS VAPORUB EX), Apply  topically., Disp: , Rfl:   •  digoxin (DIGOX) 250 MCG tablet, Take 1 tablet by mouth Daily., Disp: 90 tablet, Rfl: 3  •  felodipine (PLENDIL) 5 MG 24 hr tablet, Take 5 mg by mouth Daily., Disp: , Rfl:   •  furosemide (LASIX) 40 MG tablet, Take 40 mg by mouth 2 (Two) Times a Day., Disp: , Rfl:   •  gabapentin (NEURONTIN) 100 MG capsule, Take 1 capsule by mouth 3 (Three) Times a Day., Disp: 100 capsule, Rfl: 11  •  guaiFENesin (MUCINEX) 600 MG 12 hr tablet, Take 1,200 mg by mouth Daily., Disp: , Rfl:   •  HYDROcodone-acetaminophen (NORCO) 7.5-325 MG per tablet, Take 1 tablet by mouth Every 6 (Six) Hours As Needed for Moderate Pain (4-6)., Disp: , Rfl:   •  insulin glargine (LANTUS) 100 UNIT/ML injection, Inject  under the skin Daily., Disp: , Rfl:   •  Insulin Lispro (HUMALOG) 100 UNIT/ML solution cartridge, Inject  under the skin., Disp: , Rfl:   •  levothyroxine (SYNTHROID, LEVOTHROID) 175 MCG tablet, Take  175 mcg by mouth Daily., Disp: , Rfl:   •  metoprolol succinate XL (TOPROL-XL) 25 MG 24 hr tablet, Take 1 tablet by mouth Daily., Disp: 90 tablet, Rfl: 3  •  olmesartan (BENICAR) 40 MG tablet, TAKE 1 TABLET BY MOUTH DAILY, Disp: 90 tablet, Rfl: 3  •  pantoprazole (PROTONIX) 40 MG EC tablet, Take 40 mg by mouth Daily., Disp: , Rfl:   •  spironolactone (ALDACTONE) 25 MG tablet, Take 1 tablet by mouth Daily., Disp: 90 tablet, Rfl: 3  •  vitamin D (ERGOCALCIFEROL) 40231 units capsule capsule, Take 2,000 Units by mouth Daily., Disp: , Rfl:   •  vitamin E 400 UNIT capsule, Take 400 Units by mouth 2 (Two) Times a Day., Disp: , Rfl:   Past Medical History:   Diagnosis Date   • AICD present, double chamber    • Arteriosclerosis of coronary artery bypass graft     stents and CABG   • Atherosclerosis of native coronary artery with angina pectoris (CMS/HCC)    • Back pain, chronic    • Cardiac device in situ     Cardiac device in situ (OPTIVOL REMOTE)   • Cardiac device in situ     Cardiac device in situ, other   • Chest pain    • Chronic atrial fibrillation (CMS/HCC)    • Chronic kidney disease due to diabetes mellitus (CMS/HCC)    • Chronic systolic heart failure (CMS/HCC)     EF 45-50% 11/2014 echo   • Diabetes mellitus due to underlying condition with complications (CMS/HCC)    • Diabetes mellitus type 2, uncontrolled, without complications (CMS/HCC)    • Gout    • H/O acute myocardial infarction    • History of coronary artery bypass graft     Coronary Artery Bypass, Two   • Hyperlipidemia    • Hypothyroidism    • Ischemic cardiomyopathy    • Malignant hypertension     better control   • Myocardial infarction (CMS/HCC)    • Non-compliance    • Shortness of breath    • Shoulder pain, left    • Sleep apnea     not officially diagnosed   • Snoring    • Ventricular fibrillation (CMS/HCC)      Past Surgical History:   Procedure Laterality Date   • CARDIAC CATHETERIZATION     • CARDIAC DEFIBRILLATOR PLACEMENT     • CATARACT  EXTRACTION     • CORONARY ARTERY BYPASS GRAFT     • CORONARY STENT PLACEMENT     • INSERT / REPLACE / REMOVE PACEMAKER     • OTHER SURGICAL HISTORY      sweat gland removed   • OTHER SURGICAL HISTORY      reconstructive surgery after a spider bite   • TOTAL THYROIDECTOMY     • TUBAL ABDOMINAL LIGATION       Family History   Problem Relation Age of Onset   • Coronary artery disease Mother    • Heart attack Mother    • Coronary artery disease Father    • Heart attack Father    • Heart disease Sister    • Heart attack Sister    • Coronary artery disease Sister    • Heart attack Brother    • Heart disease Brother    • Coronary artery disease Brother    • No Known Problems Maternal Grandmother    • No Known Problems Maternal Grandfather    • No Known Problems Paternal Grandmother    • No Known Problems Paternal Grandfather    • Heart disease Brother    • Heart disease Brother    • Heart attack Brother      Social History     Socioeconomic History   • Marital status: Single     Spouse name: Not on file   • Number of children: Not on file   • Years of education: Not on file   • Highest education level: Not on file   Social Needs   • Financial resource strain: Not on file   • Food insecurity - worry: Not on file   • Food insecurity - inability: Not on file   • Transportation needs - medical: Not on file   • Transportation needs - non-medical: Not on file   Occupational History   • Not on file   Tobacco Use   • Smoking status: Current Some Day Smoker     Packs/day: 0.50     Types: Cigarettes     Start date: 1960   • Smokeless tobacco: Never Used   • Tobacco comment: 1 cigarette every now and then when gets a craving for one.  does not want to quit yet.   Substance and Sexual Activity   • Alcohol use: No   • Drug use: No   • Sexual activity: Defer   Other Topics Concern   • Not on file   Social History Narrative   • Not on file         Review of Systems   Constitution: Negative for chills, decreased appetite, fatigue, fever,  "weakness, malaise/fatigue, night sweats, unexpected weight change, weight gain and weight loss.   HENT: Negative for nosebleeds.    Eyes: Negative for visual disturbance.   Cardiovascular: Positive for dyspnea on exertion and leg swelling. Negative for chest pain, claudication, near-syncope, orthopnea, palpitations, paroxysmal nocturnal dyspnea and syncope.   Respiratory: Positive for shortness of breath. Negative for chest tightness, cough, hemoptysis, snoring and wheezing.    Endocrine: Negative for cold intolerance and heat intolerance.   Hematologic/Lymphatic: Does not bruise/bleed easily.   Skin: Negative for rash.   Musculoskeletal: Negative for back pain, falls and muscle weakness.   Gastrointestinal: Negative for abdominal pain, change in bowel habit, constipation, diarrhea, dysphagia, heartburn, nausea and vomiting.   Genitourinary: Negative for hematuria and nocturia.   Neurological: Negative for dizziness, headaches and light-headedness.   Psychiatric/Behavioral: Negative for altered mental status.   Allergic/Immunologic: Negative for persistent infections.         ECG 12 Lead  Date/Time: 2/5/2019 2:02 PM  Performed by: Livier Montes De Oca APRN  Authorized by: Livier Montes De Oca APRN   Comparison: compared with previous ECG from 9/11/2018  Rhythm: atrial fibrillation  Rate: normal  BPM: 94  Clinical impression: abnormal ECG          /62   Pulse 94   Ht 160 cm (63\")   Wt 103 kg (226 lb)   BMI 40.03 kg/m²        Objective:     Physical Exam   Constitutional: She is oriented to person, place, and time. Vital signs are normal. She appears well-developed and well-nourished. No distress.   HENT:   Head: Normocephalic and atraumatic.   Right Ear: External ear normal.   Left Ear: External ear normal.   Nose: Nose normal.   Eyes: Conjunctivae are normal. Pupils are equal, round, and reactive to light. Right eye exhibits no discharge. Left eye exhibits no discharge.   Neck: Normal range of motion. Neck supple. " JVD present. Carotid bruit is not present. No tracheal deviation present. No thyromegaly present.   Cardiovascular: Normal rate, normal heart sounds, intact distal pulses and normal pulses. An irregularly irregular rhythm present. PMI is not displaced. Exam reveals no gallop and no friction rub.   No murmur heard.  Pulses:       Radial pulses are 2+ on the right side, and 2+ on the left side.        Dorsalis pedis pulses are 2+ on the right side, and 2+ on the left side.        Posterior tibial pulses are 2+ on the right side, and 2+ on the left side.   Pulmonary/Chest: Effort normal. No respiratory distress. She has no decreased breath sounds. She has no wheezes. She has no rhonchi. She has rales in the right lower field. She exhibits no tenderness.   Abdominal: Soft. She exhibits no distension. There is no tenderness.   Musculoskeletal: Normal range of motion. She exhibits edema (Severe edema in bilateral lower extremities). She exhibits no tenderness or deformity.   Neurological: She is alert and oriented to person, place, and time.   Skin: Skin is warm and dry. No rash noted. She is not diaphoretic. There is erythema (bilateral lower extremities). No pallor.   Psychiatric: She has a normal mood and affect. Her behavior is normal. Judgment and thought content normal.   Vitals reviewed.        Assessment:          Diagnosis Plan   1. Coronary artery disease involving coronary bypass graft of native heart with angina pectoris  Atypical chest pain, likely GI related. No change since negative dobutamine stress echo 5/18.    2. Status post coronary artery stent placement     3. S/P CABG x 2  Continues on aspirin.    4. Chronic atrial fibrillation  Rate controlled. No DOAC due to history of vaginal bleeding on Pradaxa. Refuses Watchman.    5. History of vaginal bleeding     6. NYHA class III Chronic systolic congestive heart failure  EF 45-50% on last echo in 2014. 2D echo.    7. Mixed hyperlipidemia  Pt stopped statin  therapy due to myalgias. Managed by PCP.    8. Type 2 diabetes mellitus with complication, with long-term current use of insulin  Managed by PCP.    9. Class 3 obesity due to excess calories with serious comorbidity and body mass index (BMI) of 40.0 to 44.9 in adult  Patient's Body mass index is 40.03 kg/m². BMI is above normal parameters. Follow-up plan includes:  educational material.     10. AICD present   Interrogated 12/24/18. 0% A-paced, 33.5% V- paced. 78.7% Afib burden. Normal function. No therapies delivered, adequate battery reserve. Due for recheck 3/19.       11.    Hypertension  Well controlled.         12. Stage 4 chronic kidney disease (CMS/HCC) Pt has not been seen by nephrology although previously scheduled with Dr. Liriano.    13. Cellulitis of lower extremity, unspecified laterality Follow up with PCP.                               Plan:       1. Continue medications as previously prescribed.  2. Report any worsening symptoms.  3. Report any signs of bleeding.  4. Continue heart healthy diet and regular exercise as tolerated.   5. Follow up with PCP for treatment of cellulitis, blood pressure and cholesterol management and routine lab work.  6. Follow up with CHF clinic in 1 month, or sooner if needed. Discuss results of 2D echo and Entresto if needed.   7. Reviewed signs and symptoms of CHF and what to report with the patient. Patient instructed to restrict sodium and weigh daily. Report weight gain of greater than 2 lbs overnight or 5 lbs in 1 week. Pt verbalized understanding of instructions and plan of care.   8.  Monitor and record daily blood pressure. Report readings consistently higher than 140/90 or consistently lower than 100/60.   9. 2D echo.   10. Follow up with nephrology as previously scheduled.

## 2019-02-05 NOTE — PATIENT INSTRUCTIONS
Obesity, Adult  Obesity is the condition of having too much total body fat. Being overweight or obese means that your weight is greater than what is considered healthy for your body size. Obesity is determined by a measurement called BMI. BMI is an estimate of body fat and is calculated from height and weight. For adults, a BMI of 30 or higher is considered obese.  Obesity can eventually lead to other health concerns and major illnesses, including:  · Stroke.  · Coronary artery disease (CAD).  · Type 2 diabetes.  · Some types of cancer, including cancers of the colon, breast, uterus, and gallbladder.  · Osteoarthritis.  · High blood pressure (hypertension).  · High cholesterol.  · Sleep apnea.  · Gallbladder stones.  · Infertility problems.    What are the causes?  The main cause of obesity is taking in (consuming) more calories than your body uses for energy. Other factors that contribute to this condition may include:  · Being born with genes that make you more likely to become obese.  · Having a medical condition that causes obesity. These conditions include:  ? Hypothyroidism.  ? Polycystic ovarian syndrome (PCOS).  ? Binge-eating disorder.  ? Cushing syndrome.  · Taking certain medicines, such as steroids, antidepressants, and seizure medicines.  · Not being physically active (sedentary lifestyle).  · Living where there are limited places to exercise safely or buy healthy foods.  · Not getting enough sleep.    What increases the risk?  The following factors may increase your risk of this condition:  · Having a family history of obesity.  · Being a woman of -American descent.  · Being a man of  descent.    What are the signs or symptoms?  Having excessive body fat is the main symptom of this condition.  How is this diagnosed?  This condition may be diagnosed based on:  · Your symptoms.  · Your medical history.  · A physical exam. Your health care provider may measure:  ? Your BMI. If you are an  adult with a BMI between 25 and less than 30, you are considered overweight. If you are an adult with a BMI of 30 or higher, you are considered obese.  ? The distances around your hips and your waist (circumferences). These may be compared to each other to help diagnose your condition.  ? Your skinfold thickness. Your health care provider may gently pinch a fold of your skin and measure it.    How is this treated?  Treatment for this condition often includes changing your lifestyle. Treatment may include some or all of the following:  · Dietary changes. Work with your health care provider and a dietitian to set a weight-loss goal that is healthy and reasonable for you. Dietary changes may include eating:  ? Smaller portions. A portion size is the amount of a particular food that is healthy for you to eat at one time. This varies from person to person.  ? Low-calorie or low-fat options.  ? More whole grains, fruits, and vegetables.  · Regular physical activity. This may include aerobic activity (cardio) and strength training.  · Medicine to help you lose weight. Your health care provider may prescribe medicine if you are unable to lose 1 pound a week after 6 weeks of eating more healthily and doing more physical activity.  · Surgery. Surgical options may include gastric banding and gastric bypass. Surgery may be done if:  ? Other treatments have not helped to improve your condition.  ? You have a BMI of 40 or higher.  ? You have life-threatening health problems related to obesity.    Follow these instructions at home:    Eating and drinking    · Follow recommendations from your health care provider about what you eat and drink. Your health care provider may advise you to:  ? Limit fast foods, sweets, and processed snack foods.  ? Choose low-fat options, such as low-fat milk instead of whole milk.  ? Eat 5 or more servings of fruits or vegetables every day.  ? Eat at home more often. This gives you more control over  what you eat.  ? Choose healthy foods when you eat out.  ? Learn what a healthy portion size is.  ? Keep low-fat snacks on hand.  ? Avoid sugary drinks, such as soda, fruit juice, iced tea sweetened with sugar, and flavored milk.  ? Eat a healthy breakfast.  · Drink enough water to keep your urine clear or pale yellow.  · Do not go without eating for long periods of time (do not fast) or follow a fad diet. Fasting and fad diets can be unhealthy and even dangerous.  Physical Activity  · Exercise regularly, as told by your health care provider. Ask your health care provider what types of exercise are safe for you and how often you should exercise.  · Warm up and stretch before being active.  · Cool down and stretch after being active.  · Rest between periods of activity.  Lifestyle  · Limit the time that you spend in front of your TV, computer, or video game system.  · Find ways to reward yourself that do not involve food.  · Limit alcohol intake to no more than 1 drink a day for nonpregnant women and 2 drinks a day for men. One drink equals 12 oz of beer, 5 oz of wine, or 1½ oz of hard liquor.  General instructions  · Keep a weight loss journal to keep track of the food you eat and how much you exercise you get.  · Take over-the-counter and prescription medicines only as told by your health care provider.  · Take vitamins and supplements only as told by your health care provider.  · Consider joining a support group. Your health care provider may be able to recommend a support group.  · Keep all follow-up visits as told by your health care provider. This is important.  Contact a health care provider if:  · You are unable to meet your weight loss goal after 6 weeks of dietary and lifestyle changes.  This information is not intended to replace advice given to you by your health care provider. Make sure you discuss any questions you have with your health care provider.  Document Released: 01/25/2006 Document Revised:  "05/22/2017 Document Reviewed: 10/05/2016  [employer] Interactive Patient Education © 2018 [employer]    Heart Failure Eating Plan  Heart failure, also called congestive heart failure, occurs when your heart does not pump blood well enough to meet your body's needs for oxygen-rich blood. Heart failure is a long-term (chronic) condition. Living with heart failure can be challenging. However, following your health care provider's instructions about a healthy lifestyle and working with a diet and nutrition specialist (dietitian) to choose the right foods may help to improve your symptoms.  What are tips for following this plan?  General guidelines  · Do not eat more than 2,300 mg of salt (sodium) a day. The amount of sodium that is recommended for you may be lower, depending on your condition.  · Maintain a healthy body weight as directed. Ask your health care provider what a healthy weight is for you.  ? Check your weight every day.  ? Work with your health care provider and dietitian to make a plan that is right for you to lose weight or maintain your current weight.  · Limit how much fluid you drink. Ask your health care provider or dietitian how much fluid you can have each day.  · Limit or avoid alcohol as told by your health care provider or dietitian.  Reading food labels  · Check food labels for the amount of sodium per serving. Choose foods that have less than 140 mg (milligrams) of sodium in each serving.  · Check food labels for the number of calories per serving. This is important if you need to limit your daily calorie intake to lose weight.  · Check food labels for the serving size. If you eat more than one serving, you will be eating more sodium and calories than what is listed on the label.  · Look for foods that are labeled as \"sodium-free,\" \"very low sodium,\" or \"low sodium.\"  ? Foods labeled as \"reduced sodium\" or \"lightly salted\" may still have more sodium than what is recommended for " you.  Cooking  · Avoid adding salt when cooking. Ask your health care provider or dietitian before using salt substitutes.  · Season food with salt-free seasonings, spices, or herbs. Check the label of seasoning mixes to make sure they do not contain salt.  · Cook with heart-healthy oils, such as olive, canola, soybean, or sunflower oil.  · Do not weaver foods. Cook foods using low-fat methods, such as baking, boiling, grilling, and broiling.  · Limit unhealthy fats when cooking by:  ? Removing the skin from poultry, such as chicken.  ? Removing all visible fats from meats.  ? Skimming the fat off from stews, soups, and gravies before serving them.  Meal planning  · Limit your intake of:  ? Processed, canned, or pre-packaged foods.  ? Foods that are high in trans fat, such as fried foods.  ? Sweets, desserts, sugary drinks, and other foods with added sugar.  ? Full-fat dairy products, such as whole milk.  · Eat a balanced diet that includes:  ? 4-5 servings of fruit each day and 4-5 servings of vegetables each day. At each meal, try to fill half of your plate with fruits and vegetables.  ? Up to 6-8 servings of whole grains each day.  ? Up to 2 servings of lean meat, poultry, or fish each day. One serving of meat is equal to 3 oz. This is about the same size as a deck of cards.  ? 2 servings of low-fat dairy each day.  ? Heart-healthy fats. Healthy fats called omega-3 fatty acids are found in foods such as flaxseed and cold-water fish like sardines, salmon, and mackerel.  · Aim to eat 25-35 g (grams) of fiber a day. Foods that are high in fiber include apples, broccoli, carrots, beans, peas, and whole grains.  · Do not add salt or condiments that contain salt (such as soy sauce) to foods before eating.  · When eating at a restaurant, ask that your food be prepared with less salt or no salt, if possible.  · Try to eat 2 or more vegetarian meals each week.  · Eat more home-cooked food and eat less restaurant, buffet, and  fast food.  Recommended foods  The items listed may not be a complete list. Talk with your dietitian about what dietary choices are best for you.  Grains  Bread with less than 80 mg of sodium per slice. Whole-wheat pasta, quinoa, and brown rice. Oats and oatmeal. Barley. Millet. Grits and cream of wheat. Whole-grain and whole-wheat cold cereal.  Vegetables  All fresh vegetables. Vegetables that are frozen without sauce or added salt. Low-sodium or sodium-free canned vegetables.  Fruits  All fresh, frozen, and canned fruits. Dried fruits, such as raisins, prunes, and cranberries.  Meats and other protein foods  Lean cuts of meat. Skinless chicken and turkey. Fish with high omega-3 fatty acids, such as salmon, sardines, and other cold-water fishes. Eggs. Dried beans, peas, and edamame. Unsalted nuts and nut butters.  Dairy  Low-fat or nonfat (skim) milk and dried milk. Rice milk, soy milk, and almond milk. Low-fat or nonfat yogurt. Small amounts of reduced-sodium block cheese. Low-sodium cottage cheese.  Fats and oils  Olive, canola, soybean, flaxseed, or sunflower oil. Avocado.  Sweets and desserts  Apple sauce. Granola bars. Sugar-free pudding and gelatin. Frozen fruit bars.  Seasoning and other foods  Fresh and dried herbs. Lemon or lime juice. Vinegar. Low-sodium ketchup. Salt-free marinades, salad dressings, sauces, and seasonings.  Foods to avoid  The items listed may not be a complete list. Talk with your dietitian about what dietary choices are best for you.  Grains  Bread with more than 80 mg of sodium per slice. Hot or cold cereal with more than 140 mg sodium per serving. Salted pretzels and crackers. Pre-packaged breadcrumbs. Bagels, croissants, and biscuits.  Vegetables  Canned vegetables. Frozen vegetables with sauce or seasonings. Creamed vegetables. French fries. Onion rings. Pickled vegetables and sauerkraut.  Fruits  Fruits that are dried with sodium-containing preservatives.  Meats and other protein  foods  Ribs and chicken wings. Oneal, ham, pepperoni, bologna, salami, and packaged luncheon meats. Hot dogs, bratwurst, and sausage. Canned meat. Smoked meat and fish. Salted nuts and seeds.  Dairy  Whole milk, half-and-half, and cream. Buttermilk. Processed cheese, cheese spreads, and cheese curds. Regular cottage cheese. Feta cheese. Shredded cheese. String cheese.  Fats and oils  Butter, lard, shortening, ghee, and oneal fat. Canned and packaged gravies.  Seasoning and other foods  Onion salt, garlic salt, table salt, and sea salt. Marinades. Regular salad dressings. Relishes, pickles, and olives. Meat flavorings and tenderizers, and bouillon cubes. Horseradish, ketchup, and mustard. [place] sauce. Teriyaki sauce, soy sauce (including reduced sodium). Hot sauce and Tabasco sauce. Steak sauce, fish sauce, oyster sauce, and cocktail sauce. Taco seasonings. Barbecue sauce. Tartar sauce.  Summary  · A heart failure eating plan includes changes that limit your intake of sodium and unhealthy fat, and it may help you lose weight or maintain a healthy weight. Your health care provider may also recommend limiting how much fluid you drink.  · Most people with heart failure should eat no more than 2,300 mg of salt (sodium) a day. The amount of sodium that is recommended for you may be lower, depending on your condition.  · Contact your health care provider or dietitian before making any major changes to your diet.  This information is not intended to replace advice given to you by your health care provider. Make sure you discuss any questions you have with your health care provider.  Document Released: 05/04/2018 Document Revised: 05/04/2018 Document Reviewed: 05/04/2018  [employer] Interactive Patient Education © 2018 [employer]    Exercising to Lose Weight  Exercising can help you to lose weight. In order to lose weight through exercise, you need to do vigorous-intensity exercise. You can tell that you are  exercising with vigorous intensity if you are breathing very hard and fast and cannot hold a conversation while exercising.  Moderate-intensity exercise helps to maintain your current weight. You can tell that you are exercising at a moderate level if you have a higher heart rate and faster breathing, but you are still able to hold a conversation.  How often should I exercise?  Choose an activity that you enjoy and set realistic goals. Your health care provider can help you to make an activity plan that works for you. Exercise regularly as directed by your health care provider. This may include:  · Doing resistance training twice each week, such as:  ? Push-ups.  ? Sit-ups.  ? Lifting weights.  ? Using resistance bands.  · Doing a given intensity of exercise for a given amount of time. Choose from these options:  ? 150 minutes of moderate-intensity exercise every week.  ? 75 minutes of vigorous-intensity exercise every week.  ? A mix of moderate-intensity and vigorous-intensity exercise every week.    Children, pregnant women, people who are out of shape, people who are overweight, and older adults may need to consult a health care provider for individual recommendations. If you have any sort of medical condition, be sure to consult your health care provider before starting a new exercise program.  What are some activities that can help me to lose weight?  · Walking at a rate of at least 4.5 miles an hour.  · Jogging or running at a rate of 5 miles per hour.  · Biking at a rate of at least 10 miles per hour.  · Lap swimming.  · Roller-skating or in-line skating.  · Cross-country skiing.  · Vigorous competitive sports, such as football, basketball, and soccer.  · Jumping rope.  · Aerobic dancing.  How can I be more active in my day-to-day activities?  · Use the stairs instead of the elevator.  · Take a walk during your lunch break.  · If you drive, park your car farther away from work or school.  · If you take public  transportation, get off one stop early and walk the rest of the way.  · Make all of your phone calls while standing up and walking around.  · Get up, stretch, and walk around every 30 minutes throughout the day.  What guidelines should I follow while exercising?  · Do not exercise so much that you hurt yourself, feel dizzy, or get very short of breath.  · Consult your health care provider prior to starting a new exercise program.  · Wear comfortable clothes and shoes with good support.  · Drink plenty of water while you exercise to prevent dehydration or heat stroke. Body water is lost during exercise and must be replaced.  · Work out until you breathe faster and your heart beats faster.  This information is not intended to replace advice given to you by your health care provider. Make sure you discuss any questions you have with your health care provider.  Document Released: 01/20/2012 Document Revised: 05/25/2017 Document Reviewed: 05/21/2015  [employer] Interactive Patient Education © 2018 [employer]

## 2019-02-13 ENCOUNTER — APPOINTMENT (OUTPATIENT)
Dept: CARDIOLOGY | Facility: HOSPITAL | Age: 79
End: 2019-02-13

## 2019-02-20 ENCOUNTER — APPOINTMENT (OUTPATIENT)
Dept: CARDIOLOGY | Facility: HOSPITAL | Age: 79
End: 2019-02-20

## 2019-03-15 ENCOUNTER — CLINICAL SUPPORT (OUTPATIENT)
Dept: CARDIOLOGY | Facility: CLINIC | Age: 79
End: 2019-03-15

## 2019-03-15 DIAGNOSIS — Z95.810 AICD (AUTOMATIC CARDIOVERTER/DEFIBRILLATOR) PRESENT: ICD-10-CM

## 2019-03-15 PROCEDURE — 93296 REM INTERROG EVL PM/IDS: CPT | Performed by: PHYSICIAN ASSISTANT

## 2019-03-15 PROCEDURE — 93295 DEV INTERROG REMOTE 1/2/MLT: CPT | Performed by: PHYSICIAN ASSISTANT

## 2019-03-17 NOTE — PROGRESS NOTES
Dual Chamber AICD Evaluation Report  [facility]    March 17, 2019    Primary Cardiologist: Hiram  : Medtronic Model: Evera  Implant date: 5/28/15    Reason for evaluation: routine  Indication for AICD: chronic a-fib and chronic systolic heart failure    Measurements  Atrial sensing - P wave: 0.1 mV  Atrial threshold: n/r  Atrial lead impedance: 437 ohms  Ventricular sensing - R wave: 3.5 mV  Ventricular threshold: 1.25 V @ 0.4 ms  Ventricular lead impedance:  703 ohms  Shock impedance:  RV- 44 ohms   SVC- 60 ohms      Diagnostic Data  Atrial paced: 0 %  Ventricular paced: 41.4 %  Other: Pt in AF nearly 80% of the time.  No anticoagulation d/t h/o vaginal bleeding.  Battery status: satisfactory        Final Parameters  Mode: VVIR  Lower rate: 60 bpm Upper rate: 120 bpm     Atrial - Sensitivity: 0.15 mV   Ventricular - Amplitude: 2.75 V Pulse width: 0.4 ms Sensitivity: 0.3 mV   Changes made: n/a  Conclusions: normal AICD function    Follow up: 3 months

## 2019-04-15 NOTE — TELEPHONE ENCOUNTER
Needs refills on spironolactone and metoprolol succ for a 90 day supply sent to [facility] in [place].  Kevyn Ralph, CMA

## 2019-04-17 RX ORDER — SPIRONOLACTONE 25 MG/1
25 TABLET ORAL DAILY
Qty: 90 TABLET | Refills: 3 | Status: SHIPPED | OUTPATIENT
Start: 2019-04-17 | End: 2020-04-06

## 2019-04-17 RX ORDER — METOPROLOL SUCCINATE 25 MG/1
25 TABLET, EXTENDED RELEASE ORAL DAILY
Qty: 90 TABLET | Refills: 3 | Status: SHIPPED | OUTPATIENT
Start: 2019-04-17

## 2019-05-22 RX ORDER — FENOFIBRATE 145 MG/1
145 TABLET, COATED ORAL DAILY
Qty: 30 TABLET | Refills: 11 | Status: SHIPPED | OUTPATIENT
Start: 2019-05-22 | End: 2019-10-28 | Stop reason: SINTOL

## 2019-05-22 RX ORDER — GABAPENTIN 100 MG/1
100 CAPSULE ORAL 3 TIMES DAILY
Qty: 90 CAPSULE | Refills: 5 | Status: SHIPPED | OUTPATIENT
Start: 2019-05-22

## 2019-05-22 NOTE — TELEPHONE ENCOUNTER
Pt called this morning asking for a refill on the Gabapentin to be sent to [facility] in [place].  Dr. Gandhi gave this to her on 6/11/18 for leg cramps.  She was suppose to have had a BMP done shortly after she started the medication for which an order was placed 6/11/18.  I called pt to ask her if she had this done and she said no because she gets lab done at her PCP Dr. Garza and she forgot about it.  She said she had some labs done recently and will have these faxed to us.  I told pt I would pend the Rx to Dr. Gandhi but we really need the labs and if they did not do a BMP or CMP I can send her a order to have it done there at the PCP office.  She stated understanding.  Kevyn Ralph, CMA

## 2019-05-29 DIAGNOSIS — E78.5 HYPERLIPIDEMIA LDL GOAL <70: Primary | ICD-10-CM

## 2019-06-05 ENCOUNTER — APPOINTMENT (OUTPATIENT)
Dept: ULTRASOUND IMAGING | Facility: HOSPITAL | Age: 79
End: 2019-06-05

## 2019-06-05 ENCOUNTER — HOSPITAL ENCOUNTER (EMERGENCY)
Facility: HOSPITAL | Age: 79
Discharge: HOME | End: 2019-06-05
Attending: EMERGENCY MEDICINE | Admitting: EMERGENCY MEDICINE

## 2019-06-05 VITALS
HEART RATE: 84 BPM | DIASTOLIC BLOOD PRESSURE: 80 MMHG | TEMPERATURE: 97.8 F | HEIGHT: 63 IN | SYSTOLIC BLOOD PRESSURE: 143 MMHG | BODY MASS INDEX: 37.56 KG/M2 | WEIGHT: 212 LBS | RESPIRATION RATE: 17 BRPM | OXYGEN SATURATION: 97 %

## 2019-06-05 DIAGNOSIS — R60.0 LOWER EXTREMITY EDEMA: Primary | ICD-10-CM

## 2019-06-05 DIAGNOSIS — I87.2 VENOUS INSUFFICIENCY: ICD-10-CM

## 2019-06-05 DIAGNOSIS — N18.9 CHRONIC KIDNEY DISEASE, UNSPECIFIED CKD STAGE: ICD-10-CM

## 2019-06-05 LAB
ALBUMIN SERPL-MCNC: 5.1 G/DL (ref 3.5–5)
ALBUMIN/GLOB SERPL: 1.7 G/DL (ref 1.1–2.5)
ALP SERPL-CCNC: 67 U/L (ref 24–120)
ALT SERPL W P-5'-P-CCNC: 22 U/L (ref 0–54)
ANION GAP SERPL CALCULATED.3IONS-SCNC: 9 MMOL/L (ref 4–13)
AST SERPL-CCNC: 32 U/L (ref 7–45)
BASOPHILS # BLD AUTO: 0.04 10*3/MM3 (ref 0–0.2)
BASOPHILS NFR BLD AUTO: 0.5 % (ref 0–2)
BILIRUB SERPL-MCNC: 0.6 MG/DL (ref 0.1–1)
BUN BLD-MCNC: 64 MG/DL (ref 5–21)
BUN/CREAT SERPL: 38.8 (ref 7–25)
CALCIUM SPEC-SCNC: 9.7 MG/DL (ref 8.4–10.4)
CHLORIDE SERPL-SCNC: 108 MMOL/L (ref 98–110)
CO2 SERPL-SCNC: 26 MMOL/L (ref 24–31)
CREAT BLD-MCNC: 1.65 MG/DL (ref 0.5–1.4)
DEPRECATED RDW RBC AUTO: 45.8 FL (ref 40–54)
EOSINOPHIL # BLD AUTO: 0.14 10*3/MM3 (ref 0–0.7)
EOSINOPHIL NFR BLD AUTO: 1.7 % (ref 0–4)
ERYTHROCYTE [DISTWIDTH] IN BLOOD BY AUTOMATED COUNT: 13.4 % (ref 12–15)
GFR SERPL CREATININE-BSD FRML MDRD: 30 ML/MIN/1.73
GLOBULIN UR ELPH-MCNC: 3 GM/DL
GLUCOSE BLD-MCNC: 140 MG/DL (ref 70–100)
HCT VFR BLD AUTO: 33.2 % (ref 37–47)
HGB BLD-MCNC: 11.4 G/DL (ref 12–16)
IMM GRANULOCYTES # BLD AUTO: 0.02 10*3/MM3 (ref 0–0.05)
IMM GRANULOCYTES NFR BLD AUTO: 0.2 % (ref 0–5)
LYMPHOCYTES # BLD AUTO: 2.9 10*3/MM3 (ref 0.72–4.86)
LYMPHOCYTES NFR BLD AUTO: 34.5 % (ref 15–45)
MCH RBC QN AUTO: 31.6 PG (ref 28–32)
MCHC RBC AUTO-ENTMCNC: 34.3 G/DL (ref 33–36)
MCV RBC AUTO: 92 FL (ref 82–98)
MONOCYTES # BLD AUTO: 0.66 10*3/MM3 (ref 0.19–1.3)
MONOCYTES NFR BLD AUTO: 7.9 % (ref 4–12)
NEUTROPHILS # BLD AUTO: 4.64 10*3/MM3 (ref 1.87–8.4)
NEUTROPHILS NFR BLD AUTO: 55.2 % (ref 39–78)
NRBC BLD AUTO-RTO: 0 /100 WBC (ref 0–0.2)
PLATELET # BLD AUTO: 200 10*3/MM3 (ref 130–400)
PMV BLD AUTO: 10.8 FL (ref 6–12)
POTASSIUM BLD-SCNC: 4.6 MMOL/L (ref 3.5–5.3)
PROCALCITONIN SERPL-MCNC: <0.25 NG/ML
PROT SERPL-MCNC: 8.1 G/DL (ref 6.3–8.7)
RBC # BLD AUTO: 3.61 10*6/MM3 (ref 4.2–5.4)
SODIUM BLD-SCNC: 143 MMOL/L (ref 135–145)
WBC NRBC COR # BLD: 8.4 10*3/MM3 (ref 4.8–10.8)

## 2019-06-05 PROCEDURE — 85025 COMPLETE CBC W/AUTO DIFF WBC: CPT | Performed by: EMERGENCY MEDICINE

## 2019-06-05 PROCEDURE — 93970 EXTREMITY STUDY: CPT

## 2019-06-05 PROCEDURE — 99284 EMERGENCY DEPT VISIT MOD MDM: CPT

## 2019-06-05 PROCEDURE — 84145 PROCALCITONIN (PCT): CPT | Performed by: EMERGENCY MEDICINE

## 2019-06-05 PROCEDURE — 80053 COMPREHEN METABOLIC PANEL: CPT | Performed by: EMERGENCY MEDICINE

## 2019-06-05 PROCEDURE — 93970 EXTREMITY STUDY: CPT | Performed by: SURGERY

## 2019-06-05 NOTE — ED PROVIDER NOTES
Subjective   She has lower extremity discoloration for the past many months and she wants to be admitted to the hospital work that up        Illness   Location:  Lower extremity  Severity:  Moderate  Onset quality:  Gradual  Timing:  Constant  Chronicity:  Chronic  Associated symptoms: no abdominal pain, no chest pain, no congestion, no cough, no diarrhea, no fever, no headaches, no loss of consciousness, no myalgias, no nausea, no rash, no rhinorrhea, no shortness of breath, no sore throat, no vomiting and no wheezing        Review of Systems   Constitutional: Negative.  Negative for fever.   HENT: Negative.  Negative for congestion, rhinorrhea and sore throat.    Eyes: Negative.    Respiratory: Negative.  Negative for cough, shortness of breath and wheezing.    Cardiovascular: Negative.  Negative for chest pain.   Gastrointestinal: Negative.  Negative for abdominal pain, diarrhea, nausea and vomiting.   Musculoskeletal: Negative.  Negative for back pain, myalgias and neck pain.   Skin: Negative.  Negative for rash.   Neurological: Negative.  Negative for loss of consciousness and headaches.   All other systems reviewed and are negative.      Past Medical History:   Diagnosis Date   • AICD present, double chamber    • Arteriosclerosis of coronary artery bypass graft     stents and CABG   • Atherosclerosis of native coronary artery with angina pectoris (CMS/[facility])    • Back pain, chronic    • Cardiac device in situ     Cardiac device in situ (OPTIVOL REMOTE)   • Cardiac device in situ     Cardiac device in situ, other   • Chest pain    • Chronic atrial fibrillation (CMS/HCC)    • Chronic kidney disease due to diabetes mellitus (CMS/HCC)    • Chronic systolic heart failure (CMS/HCC)     EF 45-50% 11/2014 echo   • Diabetes mellitus due to underlying condition with complications (CMS/HCC)    • Diabetes mellitus type 2, uncontrolled, without complications (CMS/HCC)    • Gout    • H/O acute myocardial infarction    •  History of coronary artery bypass graft     Coronary Artery Bypass, Two   • Hyperlipidemia    • Hypothyroidism    • Ischemic cardiomyopathy    • Malignant hypertension     better control   • Myocardial infarction (CMS/HCC)    • Non-compliance    • Shortness of breath    • Shoulder pain, left    • Sleep apnea     not officially diagnosed   • Snoring    • Ventricular fibrillation (CMS/HCC)        Allergies   Allergen Reactions   • Bactrim [Sulfamethoxazole-Trimethoprim] Palpitations   • Clindamycin/Lincomycin Rash   • Levaquin [Levofloxacin] Palpitations       Past Surgical History:   Procedure Laterality Date   • CARDIAC CATHETERIZATION     • CARDIAC DEFIBRILLATOR PLACEMENT     • CATARACT EXTRACTION     • CORONARY ARTERY BYPASS GRAFT     • CORONARY STENT PLACEMENT     • INSERT / REPLACE / REMOVE PACEMAKER     • OTHER SURGICAL HISTORY      sweat gland removed   • OTHER SURGICAL HISTORY      reconstructive surgery after a spider bite   • TOTAL THYROIDECTOMY     • TUBAL ABDOMINAL LIGATION         Family History   Problem Relation Age of Onset   • Coronary artery disease Mother    • Heart attack Mother    • Coronary artery disease Father    • Heart attack Father    • Heart disease Sister    • Heart attack Sister    • Coronary artery disease Sister    • Heart attack Brother    • Heart disease Brother    • Coronary artery disease Brother    • No Known Problems Maternal Grandmother    • No Known Problems Maternal Grandfather    • No Known Problems Paternal Grandmother    • No Known Problems Paternal Grandfather    • Heart disease Brother    • Heart disease Brother    • Heart attack Brother        Social History     Socioeconomic History   • Marital status:      Spouse name: Not on file   • Number of children: Not on file   • Years of education: Not on file   • Highest education level: Not on file   Tobacco Use   • Smoking status: Current Some Day Smoker     Packs/day: 0.50     Types: Cigarettes     Start date: 1960    • Smokeless tobacco: Never Used   • Tobacco comment: 1 cigarette every now and then when gets a craving for one.  does not want to quit yet.   Substance and Sexual Activity   • Alcohol use: No   • Drug use: No   • Sexual activity: Defer           Objective   Physical Exam   Constitutional: She is oriented to person, place, and time. She appears well-developed and well-nourished.   HENT:   Head: Normocephalic and atraumatic.   Eyes: Conjunctivae and EOM are normal. Pupils are equal, round, and reactive to light.   Neck: Normal range of motion. Neck supple.   Cardiovascular: Normal rate, regular rhythm, normal heart sounds and intact distal pulses.   Pulmonary/Chest: Effort normal and breath sounds normal.   Abdominal: Soft. Bowel sounds are normal.   Musculoskeletal: Normal range of motion.   Bilateral lower extremity anterior patch of discoloration no cellulitis no tenderness   Neurological: She is alert and oriented to person, place, and time. She has normal reflexes.   Skin: Skin is warm and dry.   Psychiatric: She has a normal mood and affect.   Nursing note and vitals reviewed.      Procedures           ED Course  ED Course as of Jun 05 1717   Wed Jun 05, 2019   1710 Case discussed with the patient and she is got chronic lower extremity swelling and venous insufficiency along with some skin discoloration there is no evidence of any cellulitis lab work-up is normal procalcitonin level is 0.25 her BUN is 64 creatinine is 1.65 which is worse than what we have had in the past but the patient states that her doctor has told that she is got renal insufficiency and needs follow-up I have advised to follow-up with the nephrology and also her vascular surgery patient has got good pulses distally will discharge patient home  [TS]   1711 There was no venous thromboembolism noted at this time  [TS]      ED Course User Index  [TS] Daniel Bill MD                  [facility]      Final diagnoses:   Lower extremity edema   Venous  insufficiency   Chronic kidney disease, unspecified CKD stage            Daniel Bill MD  06/05/19 [id number]

## 2019-06-14 RX ORDER — METOPROLOL SUCCINATE 25 MG/1
25 TABLET, EXTENDED RELEASE ORAL DAILY
Qty: 90 TABLET | Refills: 0 | OUTPATIENT
Start: 2019-06-14

## 2019-06-18 ENCOUNTER — CLINICAL SUPPORT NO REQUIREMENTS (OUTPATIENT)
Dept: CARDIOLOGY | Facility: CLINIC | Age: 79
End: 2019-06-18

## 2019-06-18 DIAGNOSIS — I50.22 CHF (CONGESTIVE HEART FAILURE), NYHA CLASS III, CHRONIC, SYSTOLIC (HCC): ICD-10-CM

## 2019-06-18 DIAGNOSIS — Z95.810 AICD (AUTOMATIC CARDIOVERTER/DEFIBRILLATOR) PRESENT: Primary | ICD-10-CM

## 2019-06-18 PROCEDURE — 93289 INTERROG DEVICE EVAL HEART: CPT | Performed by: INTERNAL MEDICINE

## 2019-06-18 NOTE — PROGRESS NOTES
Dual Chamber AICD Evaluation Report  IN OFFICE INTERROGATION    June 18, 2019    Primary Cardiologist: Hiram  : Med[employer]onic Model: Evera XT DR [id number]  Implant date: 5/28/2015    Reason for evaluation: routine  Indication for AICD: chronic systolic congestive heart failure    Measurements  Atrial sensing - P wave: 0.1 mV  Atrial threshold: N/P  Atrial lead impedance: 456 ohms  Ventricular sensing - R wave: 3.1 mV  Ventricular threshold: 1.375 V @ 0.4 ms  Ventricular lead impedance:  779 ohms  Shock impedance:  RV- 43 ohms   SVC- 66 ohms      Diagnostic Data  Atrial paced: 0 %  Ventricular paced: 36.1 %    Episodes recorded since 3/15/2019:  Frequent atrial undersensing noted, yet sensitivity is at lowest possible value (most sensitive).  Discussed with Stanton Cheng, [employer] Rep, and there is no way to fix issue. This has been present since August 2017.  P-AF, burden 92.4%, ventricular rates controlled. Not anticoagulated s/t hx of vaginal bleeding while taking Pradaxa.    Battery status: satisfactory  Estimated 2.7 years remaining    Final Parameters  Mode: VVIR  Lower rate: 60 bpm   Atrial -  Sensitivity: 0.15 mV   Ventricular - Amplitude: 2.75 V Pulse width: 0.4 ms Sensitivity: 0.3 mV   Changes made: No changes made.  Conclusions: normal AICD function, no therapy delivered, adequate battery reserve and chronic atrial undersensing noted    Follow up: Every 3 months via User[employer] Live Chat, annually in office (6/23/2020)

## 2019-07-16 RX ORDER — OLMESARTAN MEDOXOMIL 40 MG/1
TABLET ORAL
Qty: 90 TABLET | Refills: 3 | Status: SHIPPED | OUTPATIENT
Start: 2019-07-16

## 2019-09-18 ENCOUNTER — CLINICAL SUPPORT (OUTPATIENT)
Dept: CARDIOLOGY | Facility: CLINIC | Age: 79
End: 2019-09-18

## 2019-09-18 DIAGNOSIS — I50.22 CHF (CONGESTIVE HEART FAILURE), NYHA CLASS III, CHRONIC, SYSTOLIC (HCC): ICD-10-CM

## 2019-09-18 PROCEDURE — 93296 REM INTERROG EVL PM/IDS: CPT | Performed by: PHYSICIAN ASSISTANT

## 2019-09-18 PROCEDURE — 93295 DEV INTERROG REMOTE 1/2/MLT: CPT | Performed by: PHYSICIAN ASSISTANT

## 2019-10-10 NOTE — PROGRESS NOTES
Dual Chamber AICD Evaluation Report  [facility]    October 10, 2019    Primary Cardiologist: Hiram  : Med[employer]onic Model: Evera  Implant date: 5/28/15    Reason for evaluation: routine  Indication for AICD: chronic systolic congestive heart failure    Measurements  Atrial sensing - P wave: 0.1 mV  Atrial threshold: n/r  Atrial lead impedance: 513 ohms  Ventricular sensing - R wave: 3.8 mV  Ventricular threshold: 1.5 V @ 0.4 ms  Ventricular lead impedance:  893 ohms  Shock impedance:  RV- 46 ohms   SVC- 67 ohms      Diagnostic Data  Atrial paced: 0 %  Ventricular paced: 46.7 %  Other: Few episodes of AF noted.  Longest episode is nearly 29 min.  Pt is not anticoagulated d/t h/o vaginal bleeding.  Battery status: satisfactory       Final Parameters  Mode: VVIR  Lower rate: 60 bpm Upper rate: 120 bpm     Atrial - Sensitivity: 0.15 mV   Ventricular - Amplitude: 3.0 V Pulse width: 0.4 ms Sensitivity: 0.3 mV   Changes made: n/a  Conclusions: normal AICD function    Follow up: 3 months

## 2019-10-28 ENCOUNTER — OFFICE VISIT (OUTPATIENT)
Dept: CARDIOLOGY | Facility: CLINIC | Age: 79
End: 2019-10-28

## 2019-10-28 VITALS
BODY MASS INDEX: 35.97 KG/M2 | DIASTOLIC BLOOD PRESSURE: 68 MMHG | HEART RATE: 86 BPM | SYSTOLIC BLOOD PRESSURE: 132 MMHG | WEIGHT: 203 LBS | HEIGHT: 63 IN

## 2019-10-28 DIAGNOSIS — E66.01 CLASS 2 SEVERE OBESITY DUE TO EXCESS CALORIES WITH SERIOUS COMORBIDITY AND BODY MASS INDEX (BMI) OF 36.0 TO 36.9 IN ADULT (HCC): ICD-10-CM

## 2019-10-28 DIAGNOSIS — I25.709 CORONARY ARTERY DISEASE INVOLVING CORONARY BYPASS GRAFT OF NATIVE HEART WITH ANGINA PECTORIS (HCC): ICD-10-CM

## 2019-10-28 DIAGNOSIS — I10 ESSENTIAL HYPERTENSION: ICD-10-CM

## 2019-10-28 DIAGNOSIS — E78.2 MIXED HYPERLIPIDEMIA: ICD-10-CM

## 2019-10-28 DIAGNOSIS — Z95.810 AICD (AUTOMATIC CARDIOVERTER/DEFIBRILLATOR) PRESENT: ICD-10-CM

## 2019-10-28 DIAGNOSIS — N18.4 STAGE 4 CHRONIC KIDNEY DISEASE (HCC): ICD-10-CM

## 2019-10-28 DIAGNOSIS — Z79.4 TYPE 2 DIABETES MELLITUS WITH COMPLICATION, WITH LONG-TERM CURRENT USE OF INSULIN (HCC): ICD-10-CM

## 2019-10-28 DIAGNOSIS — E11.8 TYPE 2 DIABETES MELLITUS WITH COMPLICATION, WITH LONG-TERM CURRENT USE OF INSULIN (HCC): ICD-10-CM

## 2019-10-28 DIAGNOSIS — I48.0 PAROXYSMAL ATRIAL FIBRILLATION (HCC): ICD-10-CM

## 2019-10-28 DIAGNOSIS — I50.22 CHF (CONGESTIVE HEART FAILURE), NYHA CLASS III, CHRONIC, SYSTOLIC (HCC): Primary | ICD-10-CM

## 2019-10-28 DIAGNOSIS — Z95.1 S/P CABG X 2: ICD-10-CM

## 2019-10-28 PROBLEM — E66.812 CLASS 2 SEVERE OBESITY DUE TO EXCESS CALORIES WITH SERIOUS COMORBIDITY AND BODY MASS INDEX (BMI) OF 36.0 TO 36.9 IN ADULT: Status: ACTIVE | Noted: 2018-04-26

## 2019-10-28 PROBLEM — I48.20 ATRIAL FIBRILLATION, CHRONIC (HCC): Status: ACTIVE | Noted: 2019-10-28

## 2019-10-28 PROCEDURE — 99214 OFFICE O/P EST MOD 30 MIN: CPT | Performed by: NURSE PRACTITIONER

## 2019-10-28 PROCEDURE — 93000 ELECTROCARDIOGRAM COMPLETE: CPT | Performed by: NURSE PRACTITIONER

## 2019-10-28 RX ORDER — RANITIDINE HCL 75 MG
150 TABLET ORAL 2 TIMES DAILY
COMMUNITY

## 2019-10-28 RX ORDER — CALCITRIOL 0.25 UG/1
CAPSULE, LIQUID FILLED ORAL
Refills: 5 | COMMUNITY
Start: 2019-08-20

## 2019-10-28 NOTE — PATIENT INSTRUCTIONS
Heart Failure  Heart failure is a condition in which the heart has trouble pumping blood because it has become weak or stiff. This means that the heart does not pump blood efficiently for the body to work well. For some people with heart failure, fluid may back up into the lungs and there may be swelling (edema) in the lower legs. Heart failure is usually a long-term (chronic) condition. It is important for you to take good care of yourself and follow the treatment plan from your health care provider.  What are the causes?  This condition is caused by some health problems, including:  · High blood pressure (hypertension). Hypertension causes the heart muscle to work harder than normal. High blood pressure eventually causes the heart to become stiff and weak.  · Coronary artery disease (CAD). CAD is the buildup of cholesterol and fat (plaques) in the arteries of the heart.  · Heart attack (myocardial infarction). Injured tissue, which is caused by the heart attack, does not contract as well and the heart's ability to pump blood is weakened.  · Abnormal heart valves. When the heart valves do not open and close properly, the heart muscle must pump harder to keep the blood flowing.  · Heart muscle disease (cardiomyopathy or myocarditis). Heart muscle disease is damage to the heart muscle from a variety of causes, such as drug or alcohol abuse, infections, or unknown causes. These can increase the risk of heart failure.  · Lung disease. When the lungs do not work properly, the heart must work harder.  What increases the risk?  Risk of heart failure increases as a person ages. This condition is also more likely to develop in people who:  · Are overweight.  · Are male.  · Smoke or chew tobacco.  · Abuse alcohol or illegal drugs.  · Have taken medicines that can damage the heart, such as chemotherapy drugs.  · Have diabetes.  ? High blood sugar (glucose) is associated with high fat (lipid) levels in the blood.  ? Diabetes  can also damage tiny blood vessels that carry nutrients to the heart muscle.  · Have abnormal heart rhythms.  · Have thyroid problems.  · Have low blood counts (anemia).  What are the signs or symptoms?  Symptoms of this condition include:  · Shortness of breath with activity, such as when climbing stairs.  · Persistent cough.  · Swelling of the feet, ankles, legs, or abdomen.  · Unexplained weight gain.  · Difficulty breathing when lying flat (orthopnea).  · Waking from sleep because of the need to sit up and get more air.  · Rapid heartbeat.  · Fatigue and loss of energy.  · Feeling light-headed, dizzy, or close to fainting.  · Loss of appetite.  · Nausea.  · Increased urination during the night (nocturia).  · Confusion.  How is this diagnosed?  This condition is diagnosed based on:  · Medical history, symptoms, and a physical exam.  · Diagnostic tests, which may include:  ? Echocardiogram.  ? Electrocardiogram (ECG).  ? Chest X-ray.  ? Blood tests.  ? Exercise stress test.  ? Radionuclide scans.  ? Cardiac catheterization and angiogram.  How is this treated?  Treatment for this condition is aimed at managing the symptoms of heart failure. Medicines, behavioral changes, or other treatments may be necessary to treat heart failure.  Medicines  These may include:  · Angiotensin-converting enzyme (ACE) inhibitors. This type of medicine blocks the effects of a blood protein called angiotensin-converting enzyme. ACE inhibitors relax (dilate) the blood vessels and help to lower blood pressure.  · Angiotensin receptor blockers (ARBs). This type of medicine blocks the actions of a blood protein called angiotensin. ARBs dilate the blood vessels and help to lower blood pressure.  · Water pills (diuretics). Diuretics cause the kidneys to remove salt and water from the blood. The extra fluid is removed through urination, leaving a lower volume of blood that the heart has to pump.  · Beta blockers. These improve heart muscle  strength and they prevent the heart from beating too quickly.  · Digoxin. This increases the force of the heartbeat.  Healthy behavior changes  These may include:  · Reaching and maintaining a healthy weight.  · Stopping smoking or chewing tobacco.  · Eating heart-healthy foods.  · Limiting or avoiding alcohol.  · Stopping use of street drugs (illegal drugs).  · Physical activity.  Other treatments  These may include:  · Surgery to open blocked coronary arteries or repair damaged heart valves.  · Placement of a biventricular pacemaker to improve heart muscle function (cardiac resynchronization therapy). This device paces both the right ventricle and left ventricle.  · Placement of a device to treat serious abnormal heart rhythms (implantable cardioverter defibrillator, or ICD).  · Placement of a device to improve the pumping ability of the heart (left ventricular assist device, or LVAD).  · Heart transplant. This can cure heart failure, and it is considered for certain patients who do not improve with other therapies.  Follow these instructions at home:  Medicines  · Take over-the-counter and prescription medicines only as told by your health care provider. Medicines are important in reducing the workload of your heart, slowing the progression of heart failure, and improving your symptoms.  ? Do not stop taking your medicine unless your health care provider told you to do that.  ? Do not skip any dose of medicine.  ? Refill your prescriptions before you run out of medicine. You need your medicines every day.  Eating and drinking    · Eat heart-healthy foods. Talk with a dietitian to make an eating plan that is right for you.  ? Choose foods that contain no trans fat and are low in saturated fat and cholesterol. Healthy choices include fresh or frozen fruits and vegetables, fish, lean meats, legumes, fat-free or low-fat dairy products, and whole-grain or high-fiber foods.  ? Limit salt (sodium) if directed by your  health care provider. Sodium restriction may reduce symptoms of heart failure. Ask a dietitian to recommend heart-healthy seasonings.  ? Use healthy cooking methods instead of frying. Healthy methods include roasting, grilling, broiling, baking, poaching, steaming, and stir-frying.  · Limit your fluid intake if directed by your health care provider. Fluid restriction may reduce symptoms of heart failure.  Lifestyle    · Stop smoking or using chewing tobacco. Nicotine and tobacco can damage your heart and your blood vessels. Do not use nicotine gum or patches before talking to your health care provider.  · Limit alcohol intake to no more than 1 drink per day for non-pregnant women and 2 drinks per day for men. One drink equals 12 oz of beer, 5 oz of wine, or 1½ oz of hard liquor.  ? Drinking more than that is harmful to your heart. Tell your health care provider if you drink alcohol several times a week.  ? Talk with your health care provider about whether any level of alcohol use is safe for you.  ? If your heart has already been damaged by alcohol or you have severe heart failure, drinking alcohol should be stopped completely.  · Stop use of illegal drugs.  · Lose weight if directed by your health care provider. Weight loss may reduce symptoms of heart failure.  · Do moderate physical activity if directed by your health care provider. People who are elderly and people with severe heart failure should consult with a health care provider for physical activity recommendations.  Monitor important information    · Weigh yourself every day. Keeping track of your weight daily helps you to notice excess fluid sooner.  ? Weigh yourself every morning after you urinate and before you eat breakfast.  ? Wear the same amount of clothing each time you weigh yourself.  ? Record your daily weight. Provide your health care provider with your weight record.  · Monitor and record your blood pressure as told by your health care  provider.  · Check your pulse as told by your health care provider.  Dealing with extreme temperatures  · If the weather is extremely hot:  ? Avoid vigorous physical activity.  ? Use air conditioning or fans or seek a cooler location.  ? Avoid caffeine and alcohol.  ? Wear loose-fitting, lightweight, and light-colored clothing.  · If the weather is extremely cold:  ? Avoid vigorous physical activity.  ? Layer your clothes.  ? Wear mittens or gloves, a hat, and a scarf when you go outside.  ? Avoid alcohol.  General instructions  · Manage other health conditions such as hypertension, diabetes, thyroid disease, or abnormal heart rhythms as told by your health care provider.  · Learn to manage stress. If you need help to do this, ask your health care provider.  · Plan rest periods when fatigued.  · Get ongoing education and support as needed.  · Participate in or seek rehabilitation as needed to maintain or improve independence and quality of life.  · Stay up to date with immunizations. Keeping current on pneumococcal and influenza immunizations is especially important to prevent respiratory infections.  · Keep all follow-up visits as told by your health care provider. This is important.  Contact a health care provider if:  · You have a rapid weight gain.  · You have increasing shortness of breath that is unusual for you.  · You are unable to participate in your usual physical activities.  · You tire easily.  · You cough more than normal, especially with physical activity.  · You have any swelling or more swelling in areas such as your hands, feet, ankles, or abdomen.  · You are unable to sleep because it is hard to breathe.  · You feel like your heart is beating quickly (palpitations).  · You become dizzy or light-headed when you stand up.  Get help right away if:  · You have difficulty breathing.  · You notice or your family notices a change in your awareness, such as having trouble staying awake or having difficulty  with concentration.  · You have pain or discomfort in your chest.  · You have an episode of fainting (syncope).  This information is not intended to replace advice given to you by your health care provider. Make sure you discuss any questions you have with your health care provider.  Document Released: 12/18/2006 Document Revised: 11/16/2018 Document Reviewed: 07/12/2017  [employer] Interactive Patient Education © 2019 [employer].  Heart-Healthy Eating Plan  Many factors influence your heart (coronary) health, including eating and exercise habits. Coronary risk increases with abnormal blood fat (lipid) levels. Heart-healthy meal planning includes limiting unhealthy fats, increasing healthy fats, and making other diet and lifestyle changes.  What is my plan?  Your health care provider may recommend that you:  · Limit your fat intake to _________% or less of your total calories each day.  · Limit your saturated fat intake to _________% or less of your total calories each day.  · Limit the amount of cholesterol in your diet to less than _________ mg per day.  What are tips for following this plan?  Cooking  Cook foods using methods other than frying. Baking, boiling, grilling, and broiling are all good options. Other ways to reduce fat include:  · Removing the skin from poultry.  · Removing all visible fats from meats.  · Steaming vegetables in water or broth.  Meal planning    · At meals, imagine dividing your plate into fourths:  ? Fill one-half of your plate with vegetables and green salads.  ? Fill one-fourth of your plate with whole grains.  ? Fill one-fourth of your plate with lean protein foods.  · Eat 4-5 servings of vegetables per day. One serving equals 1 cup raw or cooked vegetable, or 2 cups raw leafy greens.  · Eat 4-5 servings of fruit per day. One serving equals 1 medium whole fruit, ¼ cup dried fruit, ½ cup fresh, frozen, or canned fruit, or ½ cup 100% fruit juice.  · Eat more foods that contain soluble  fiber. Examples include apples, broccoli, carrots, beans, peas, and barley. Aim to get 25-30 g of fiber per day.  · Increase your consumption of legumes, nuts, and seeds to 4-5 servings per week. One serving of dried beans or legumes equals ½ cup cooked, 1 serving of nuts is ¼ cup, and 1 serving of seeds equals 1 tablespoon.  Fats  · Choose healthy fats more often. Choose monounsaturated and polyunsaturated fats, such as olive and canola oils, flaxseeds, walnuts, almonds, and seeds.  · Eat more omega-3 fats. Choose salmon, mackerel, sardines, tuna, flaxseed oil, and ground flaxseeds. Aim to eat fish at least 2 times each week.  · Check food labels carefully to identify foods with trans fats or high amounts of saturated fat.  · Limit saturated fats. These are found in animal products, such as meats, butter, and cream. Plant sources of saturated fats include palm oil, palm kernel oil, and coconut oil.  · Avoid foods with partially hydrogenated oils in them. These contain trans fats. Examples are stick margarine, some tub margarines, cookies, crackers, and other baked goods.  · Avoid fried foods.  General information  · Eat more home-cooked food and less restaurant, buffet, and fast food.  · Limit or avoid alcohol.  · Limit foods that are high in starch and sugar.  · Lose weight if you are overweight. Losing just 5-10% of your body weight can help your overall health and prevent diseases such as diabetes and heart disease.  · Monitor your salt (sodium) intake, especially if you have high blood pressure. Talk with your health care provider about your sodium intake.  · Try to incorporate more vegetarian meals weekly.  What foods can I eat?  Fruits  All fresh, canned (in natural juice), or frozen fruits.  Vegetables  Fresh or frozen vegetables (raw, steamed, roasted, or grilled). Green salads.  Grains  Most grains. Choose whole wheat and whole grains most of the time. Rice and pasta, including brown rice and pastas made  with whole wheat.  Meats and other proteins  Lean, well-trimmed beef, veal, pork, and lamb. Chicken and turkey without skin. All fish and shellfish. Wild duck, rabbit, pheasant, and venison. Egg whites or low-cholesterol egg substitutes. Dried beans, peas, lentils, and tofu. Seeds and most nuts.  Dairy  Low-fat or nonfat cheeses, including ricotta and mozzarella. Skim or 1% milk (liquid, powdered, or evaporated). Buttermilk made with low-fat milk. Nonfat or low-fat yogurt.  Fats and oils  Non-hydrogenated (trans-free) margarines. Vegetable oils, including soybean, sesame, sunflower, olive, peanut, safflower, corn, canola, and cottonseed. Salad dressings or mayonnaise made with a vegetable oil.  Beverages  Water (mineral or sparkling). Coffee and tea. Diet carbonated beverages.  Sweets and desserts  Sherbet, gelatin, and fruit ice. Small amounts of dark chocolate.  Limit all sweets and desserts.  Seasonings and condiments  All seasonings and condiments.  The items listed above may not be a complete list of foods and beverages you can eat. Contact a dietitian for more options.  What foods are not recommended?  Fruits  Canned fruit in heavy syrup. Fruit in cream or butter sauce. Fried fruit. Limit coconut.  Vegetables  Vegetables cooked in cheese, cream, or butter sauce. Fried vegetables.  Grains  Breads made with saturated or trans fats, oils, or whole milk. Croissants. Sweet rolls. Donuts. High-fat crackers, such as cheese crackers.  Meats and other proteins  Fatty meats, such as hot dogs, ribs, sausage, oneal, rib-eye roast or steak. High-fat deli meats, such as salami and bologna. Caviar. Domestic duck and goose. Organ meats, such as liver.  Dairy  Cream, sour cream, cream cheese, and creamed cottage cheese. Whole milk cheeses. Whole or 2% milk (liquid, evaporated, or condensed). Whole buttermilk. Cream sauce or high-fat cheese sauce. Whole-milk yogurt.  Fats and oils  Meat fat, or shortening. Cocoa butter,  hydrogenated oils, palm oil, coconut oil, palm kernel oil. Solid fats and shortenings, including oneal fat, salt pork, lard, and butter. Nondairy cream substitutes. Salad dressings with cheese or sour cream.  Beverages  Regular sodas and any drinks with added sugar.  Sweets and desserts  Frosting. Pudding. Cookies. Cakes. Pies. Milk chocolate or white chocolate. Buttered syrups. Full-fat ice cream or ice cream drinks.  The items listed above may not be a complete list of foods and beverages to avoid. Contact a dietitian for more information.  Summary  · Heart-healthy meal planning includes limiting unhealthy fats, increasing healthy fats, and making other diet and lifestyle changes.  · Lose weight if you are overweight. Losing just 5-10% of your body weight can help your overall health and prevent diseases such as diabetes and heart disease.  · Focus on eating a balance of foods, including fruits and vegetables, low-fat or nonfat dairy, lean protein, nuts and legumes, whole grains, and heart-healthy oils and fats.  This information is not intended to replace advice given to you by your health care provider. Make sure you discuss any questions you have with your health care provider.  Document Released: 09/26/2009 Document Revised: 01/25/2019 Document Reviewed: 01/25/2019  [employer] Interactive Patient Education © 2019 [employer]  BMI for Adults    Body mass index (BMI) is a number that is calculated from a person's weight and height. BMI may help to estimate how much of a person's weight is composed of fat. BMI can help identify those who may be at higher risk for certain medical problems.  How is BMI used with adults?  BMI is used as a screening tool to identify possible weight problems. It is used to check whether a person is obese, overweight, healthy weight, or underweight.  How is BMI calculated?  BMI measures your weight and compares it to your height. This can be done either in English ([place] or metric  "measurements. Note that charts are available to help you find your BMI quickly and easily without having to do these calculations yourself.  To calculate your BMI in English ([place] measurements, your health care provider will:  1. Measure your weight in pounds (lb).  2. Multiply the number of pounds by 703.  ? For example, for a person who weighs 180 lb, multiply that number by 703, which equals 126,540.  3. Measure your height in inches (in). Then multiply that number by itself to get a measurement called \"inches squared.\"  ? For example, for a person who is 70 in tall, the \"inches squared\" measurement is 70 in x 70 in, which equals 4900 inches squared.  4. Divide the total from Step 2 (number of lb x 703) by the total from Step 3 (inches squared): 126,540 ÷ 4900 = 25.8. This is your BMI.  To calculate your BMI in metric measurements, your health care provider will:  1. Measure your weight in kilograms (kg).  2. Measure your height in meters (m). Then multiply that number by itself to get a measurement called \"meters squared.\"  ? For example, for a person who is 1.75 m tall, the \"meters squared\" measurement is 1.75 m x 1.75 m, which is equal to 3.1 meters squared.  3. Divide the number of kilograms (your weight) by the meters squared number. In this example: 70 ÷ 3.1 = 22.6. This is your BMI.  How is BMI interpreted?  To interpret your results, your health care provider will use BMI charts to identify whether you are underweight, normal weight, overweight, or obese. The following guidelines will be used:  · Underweight: BMI less than 18.5.  · Normal weight: BMI between 18.5 and 24.9.  · Overweight: BMI between 25 and 29.9.  · Obese: BMI of 30 and above.  Please note:  · Weight includes both fat and muscle, so someone with a muscular build, such as an athlete, may have a BMI that is higher than 24.9. In cases like these, BMI is not an accurate measure of body fat.  · To determine if excess body fat is the cause of a " BMI of 25 or higher, further assessments may need to be done by a health care provider.  · BMI is usually interpreted in the same way for men and women.  Why is BMI a useful tool?  BMI is useful in two ways:  · Identifying a weight problem that may be related to a medical condition, or that may increase the risk for medical problems.  · Promoting lifestyle and diet changes in order to reach a healthy weight.  Summary  · Body mass index (BMI) is a number that is calculated from a person's weight and height.  · BMI may help to estimate how much of a person's weight is composed of fat. BMI can help identify those who may be at higher risk for certain medical problems.  · BMI can be measured using English measurements or metric measurements.  · To interpret your results, your health care provider will use BMI charts to identify whether you are underweight, normal weight, overweight, or obese.  This information is not intended to replace advice given to you by your health care provider. Make sure you discuss any questions you have with your health care provider.  Document Released: 08/29/2005 Document Revised: 10/31/2018 Document Reviewed: 10/31/2018  [employer] Interactive Patient Education © 2019 [employer]

## 2019-10-28 NOTE — PROGRESS NOTES
Subjective:     Encounter Date:10/28/2019      Patient ID: Cary Shah is a 79 y.o. female with a history of congestive heart failure s/p AICD placement, coronary artery disease s/p stent placement and CABG x2 in '03, chronic atrial fibrillation, hyperlipidemia and diabetes.    Chief Complaint:  Coronary Artery Disease   Presents for follow-up visit. Symptoms include leg swelling, palpitations and shortness of breath. Pertinent negatives include no chest pain, chest pressure, chest tightness, dizziness or weight gain. Risk factors include hyperlipidemia and obesity. Her past medical history is significant for CHF. The symptoms have been stable. Compliance with diet is poor. Compliance with exercise is poor. Compliance with medications is poor.   Congestive Heart Failure   Presents for follow-up visit. Associated symptoms include edema, orthopnea, palpitations, shortness of breath and unexpected weight change. Pertinent negatives include no chest pain, chest pressure, near-syncope or paroxysmal nocturnal dyspnea. The symptoms have been stable. Her past medical history is significant for CAD. Compliance with total regimen is 26-50%.   Atrial Fibrillation   Presents for follow-up visit. Symptoms include palpitations and shortness of breath. Symptoms are negative for chest pain, dizziness, syncope and weakness. The symptoms have been stable. Past medical history includes atrial fibrillation, CAD, CHF and hyperlipidemia.   Hyperlipidemia   This is a chronic problem. The current episode started more than 1 year ago. The problem is uncontrolled. Recent lipid tests were reviewed and are high. Exacerbating diseases include chronic renal disease, diabetes, hypothyroidism and obesity. Associated symptoms include shortness of breath. Pertinent negatives include no chest pain.     Patient presents today for a follow up. She was last seen in [facility] and the discussion of Entresto was brought up. A 2D echo was ordered  "to determine if she would be a candidate for treatment and she never had it done. She states she does not want to take any more or any different medications. She states she had labs drawn about 3 months ago and her PCP wanted to send her to a surgeon. She states she is not seeing a surgeon. She notes she wants to be a DNR and only continue the current medications she is taking. She is not taking cholesterol medication due to side effects. She never started taking fenofibrate due to the possibility of it causing muscle cramps. She does weigh herself daily and notes she has lost 30lbs over the past few months. She thinks she might have cancer but she refuses to have any workup completed. She notes good and bad days with her breathing. She notes occasional palpitations. She has chronic swelling that she notes to be a tad worse than normal. She saw a kidney doctor once but states \"all they talked about was watching my sugar and salt intake and I've heard all that before so I don't know that I will go back\".     The following portions of the patient's history were reviewed and updated as appropriate: allergies, current medications, past family history, past medical history, past social history, past surgical history and problem list.    Allergies   Allergen Reactions   • Bactrim [Sulfamethoxazole-Trimethoprim] Palpitations   • Clindamycin/Lincomycin Rash   • Levaquin [Levofloxacin] Palpitations       Current Outpatient Medications:   •  allopurinol (ZYLOPRIM) 100 MG tablet, Take 100 mg by mouth Daily., Disp: , Rfl:   •  aspirin  MG tablet, Take 325 mg by mouth Daily., Disp: , Rfl:   •  calcitriol (ROCALTROL) 0.25 MCG capsule, TK ONE C PO  QD, Disp: , Rfl: 5  •  Camphor-Eucalyptus-Menthol (VICKS VAPORUB EX), Apply  topically., Disp: , Rfl:   •  digoxin (DIGOX) 250 MCG tablet, Take 1 tablet by mouth Daily., Disp: 90 tablet, Rfl: 3  •  felodipine (PLENDIL) 5 MG 24 hr tablet, Take 5 mg by mouth Daily., Disp: , Rfl:   • "  furosemide (LASIX) 40 MG tablet, Take 40 mg by mouth 2 (Two) Times a Day., Disp: , Rfl:   •  gabapentin (NEURONTIN) 100 MG capsule, Take 1 capsule by mouth 3 (Three) Times a Day., Disp: 90 capsule, Rfl: 5  •  guaiFENesin (MUCINEX) 600 MG 12 hr tablet, Take 1,200 mg by mouth Daily., Disp: , Rfl:   •  HYDROcodone-acetaminophen (NORCO) 7.5-325 MG per tablet, Take 1 tablet by mouth Every 6 (Six) Hours As Needed for Moderate Pain (4-6)., Disp: , Rfl:   •  insulin glargine (LANTUS) 100 UNIT/ML injection, Inject  under the skin Daily., Disp: , Rfl:   •  Insulin Lispro (HUMALOG) 100 UNIT/ML solution cartridge, Inject  under the skin., Disp: , Rfl:   •  levothyroxine (SYNTHROID, LEVOTHROID) 175 MCG tablet, Take 175 mcg by mouth Daily., Disp: , Rfl:   •  metoprolol succinate XL (TOPROL-XL) 25 MG 24 hr tablet, Take 1 tablet by mouth Daily., Disp: 90 tablet, Rfl: 3  •  olmesartan (BENICAR) 40 MG tablet, TAKE 1 TABLET BY MOUTH DAILY, Disp: 90 tablet, Rfl: 3  •  raNITIdine (ZANTAC) 75 MG tablet, Take 150 mg by mouth 2 (Two) Times a Day., Disp: , Rfl:   •  spironolactone (ALDACTONE) 25 MG tablet, Take 1 tablet by mouth Daily., Disp: 90 tablet, Rfl: 3  •  vitamin D (ERGOCALCIFEROL) 95775 units capsule capsule, Take 2,000 Units by mouth Daily., Disp: , Rfl:   •  vitamin E 400 UNIT capsule, Take 400 Units by mouth 2 (Two) Times a Day., Disp: , Rfl:   Past Medical History:   Diagnosis Date   • AICD present, double chamber    • Arteriosclerosis of coronary artery bypass graft     stents and CABG   • Atherosclerosis of native coronary artery with angina pectoris (CMS/HCC)    • Back pain, chronic    • Cardiac device in situ     Cardiac device in situ (OPTIVOL REMOTE)   • Cardiac device in situ     Cardiac device in situ, other   • Chest pain    • Chronic atrial fibrillation    • Chronic kidney disease due to diabetes mellitus (CMS/HCC)    • Chronic systolic heart failure (CMS/HCC)     EF 45-50% 11/2014 echo   • Diabetes mellitus due to  underlying condition with complications (CMS/HCC)    • Diabetes mellitus type 2, uncontrolled, without complications (CMS/HCC)    • Gout    • H/O acute myocardial infarction    • History of coronary artery bypass graft     Coronary Artery Bypass, Two   • Hyperlipidemia    • Hypothyroidism    • Ischemic cardiomyopathy    • Malignant hypertension     better control   • Myocardial infarction (CMS/HCC)    • Non-compliance    • Shortness of breath    • Shoulder pain, left    • Sleep apnea     not officially diagnosed   • Snoring    • Ventricular fibrillation (CMS/HCC)      Family History   Problem Relation Age of Onset   • Coronary artery disease Mother    • Heart attack Mother    • Coronary artery disease Father    • Heart attack Father    • Heart disease Sister    • Heart attack Sister    • Coronary artery disease Sister    • Heart attack Brother    • Heart disease Brother    • Coronary artery disease Brother    • No Known Problems Maternal Grandmother    • No Known Problems Maternal Grandfather    • No Known Problems Paternal Grandmother    • No Known Problems Paternal Grandfather    • Heart disease Brother    • Heart disease Brother    • Heart attack Brother      Past Surgical History:   Procedure Laterality Date   • CARDIAC CATHETERIZATION     • CARDIAC DEFIBRILLATOR PLACEMENT     • CATARACT EXTRACTION     • CORONARY ARTERY BYPASS GRAFT     • CORONARY STENT PLACEMENT     • INSERT / REPLACE / REMOVE PACEMAKER     • OTHER SURGICAL HISTORY      sweat gland removed   • OTHER SURGICAL HISTORY      reconstructive surgery after a spider bite   • TOTAL THYROIDECTOMY     • TUBAL ABDOMINAL LIGATION       Social History     Socioeconomic History   • Marital status:      Spouse name: Not on file   • Number of children: Not on file   • Years of education: Not on file   • Highest education level: Not on file   Tobacco Use   • Smoking status: Current Some Day Smoker     Packs/day: 0.50     Types: Cigarettes     Start date:  "1960   • Smokeless tobacco: Never Used   • Tobacco comment: 1 cigarette every now and then when gets a craving for one.  does not want to quit yet.   Substance and Sexual Activity   • Alcohol use: No   • Drug use: No   • Sexual activity: Defer       Review of Systems   Constitution: Positive for unexpected weight change and weight loss. Negative for weakness, malaise/fatigue and weight gain.   Cardiovascular: Positive for leg swelling and palpitations. Negative for chest pain, dyspnea on exertion, irregular heartbeat, near-syncope, orthopnea, paroxysmal nocturnal dyspnea and syncope.   Respiratory: Positive for shortness of breath. Negative for chest tightness, cough, sleep disturbances due to breathing, sputum production and wheezing.    Skin: Negative for dry skin, flushing, itching and rash.   Gastrointestinal: Negative for hematemesis and hematochezia.   Neurological: Negative for dizziness, light-headedness and loss of balance.         ECG 12 Lead  Date/Time: 10/28/2019 2:24 PM  Performed by: Manjula Carrillo APRN  Authorized by: Manjula Carrillo APRN   Comparison: compared with previous ECG from 2/5/2019  Similar to previous ECG  Rhythm: atrial fibrillation  Rate: normal  BPM: 84  QRS axis: right  Other findings: T wave abnormality    Clinical impression: abnormal EKG          /68   Pulse 86   Ht 160 cm (63\")   Wt 92.1 kg (203 lb)   BMI 35.96 kg/m²        Objective:     Physical Exam   Constitutional: She is oriented to person, place, and time. She appears well-developed and well-nourished. No distress.   HENT:   Head: Normocephalic.   Mouth/Throat: No oropharyngeal exudate.   Eyes: Conjunctivae and EOM are normal. Pupils are equal, round, and reactive to light. No scleral icterus.   Neck: Normal range of motion. Neck supple.   Cardiovascular: Normal rate, normal heart sounds and intact distal pulses. An irregularly irregular rhythm present.   No murmur heard.  Pulmonary/Chest: Effort normal and " breath sounds normal. No respiratory distress. She has no wheezes. She has no rales. She exhibits no tenderness.   Abdominal: Soft. Bowel sounds are normal. She exhibits no distension. There is no tenderness.   Musculoskeletal: Normal range of motion. She exhibits edema (3+ pitting BLE) and tenderness (BLE).   Neurological: She is alert and oriented to person, place, and time. She has normal reflexes.   Skin: Skin is warm and dry. No rash noted. She is not diaphoretic. No erythema. No pallor.   Psychiatric: She has a normal mood and affect. Her behavior is normal.   Vitals reviewed.      Lab Review:         Assessment:          Diagnosis Plan   1. CHF (congestive heart failure), NYHA class III, chronic, systolic (CMS/[facility])     2. AICD (automatic cardioverter/defibrillator) present     3. Coronary artery disease involving coronary bypass graft of native heart with angina pectoris (CMS/[facility])     4. S/P CABG x 2     5. Paroxysmal atrial fibrillation (CMS/[facility])     6. Mixed hyperlipidemia     7. Essential hypertension     8. Type 2 diabetes mellitus with complication, with long-term current use of insulin (CMS/HCC)     9. Stage 4 chronic kidney disease (CMS/[facility])     10. Class 2 severe obesity due to excess calories with serious comorbidity and body mass index (BMI) of 36.0 to 36.9 in adult (CMS/[facility])            Plan:       1. CHF- stable. Compensated. Could use increase in diuretics but would need labs in a week and patient states she does not want any change in her treatment plan. Will continue current therapy. She refuses wanting to start Entresto. Reviewed signs and symptoms of CHF and what to report with the patient. Patient instructed to restrict sodium and weigh daily. Report weight gain of greater than 2 lbs overnight or 5 lbs in 1 week. Pt verbalized understanding of instructions and plan of care.   2. AICD- normal function. A few episodes of Afib noted.   3. CAD- stable. No clinical signs of ischemia. On ASA.  Negative dobutamine in '18  4. Hx CABG  5. Chronic afib- rate controlled. a few episodes note on previous device interrogation. Unable to tolerate anticoagulation due to vaginal bleeding. Refuses watchman.   6. HLD- uncontrolled. LDL unable to be calculated. Educated about importance of cholesterol control with CAD. Discussed PCSK9. She states she will try to take her fenofibrate to see if she can tolerate it. She will contact us if she wants to start a PCSK9.  7. HTN- controlled. Followed by PCP.   8. DM2- last A1C we have is 8.7. Followed by PCP  9. CKD4- has seen nephrology. Encouraged to keep following.   10. Obesity- Patient's Body mass index is 35.96 kg/m². BMI is above normal parameters. Recommendations include: educational material, exercise counseling and nutrition counseling.    Suggested a 3-6 month follow up for heart failure. Patient states, due to her low income she would like to come yearly and return sooner if symptoms arise.   Return in 1 year or sooner if symptoms worsen.

## 2019-12-19 ENCOUNTER — CLINICAL SUPPORT (OUTPATIENT)
Dept: CARDIOLOGY | Facility: CLINIC | Age: 79
End: 2019-12-19

## 2019-12-19 DIAGNOSIS — Z95.810 AICD (AUTOMATIC CARDIOVERTER/DEFIBRILLATOR) PRESENT: ICD-10-CM

## 2019-12-19 PROCEDURE — 93296 REM INTERROG EVL PM/IDS: CPT | Performed by: PHYSICIAN ASSISTANT

## 2019-12-19 PROCEDURE — 93295 DEV INTERROG REMOTE 1/2/MLT: CPT | Performed by: PHYSICIAN ASSISTANT

## 2019-12-20 NOTE — PROGRESS NOTES
Dual Chamber AICD Evaluation Report  [facility]    December 19, 2019    Primary Cardiologist: Hiram  : [employer] Model: Evera  Implant date: 5/28/15    Reason for evaluation: routine  Indication for AICD: chronic systolic congestive heart failure    Measurements  Atrial sensing - P wave: 0.1 mV  Atrial threshold: n/r  Atrial lead impedance: 399 ohms  Ventricular sensing - R wave: 3.1 mV  Ventricular threshold: 1.25 V @ 0.4 ms  Ventricular lead impedance:  646 ohms  Shock impedance:  RV- 45 ohms   SVC- 62 ohms      Diagnostic Data  Atrial paced: 0 %  Ventricular paced: 47.4 %  Other: 55.8% AF burden.  Pt is not anticoagulated d/t h/o vaginal bleeding.  Battery status: satisfactory      Final Parameters  Mode: VVIR  Lower rate: 60 bpm Upper rate: 120 bpm     Atrial - Sensitivity: 0.15 mV   Ventricular - Amplitude: 2.5 V Pulse width: 0.4 ms Sensitivity: 0.3 mV   Changes made: n/a  Conclusions: normal AICD function    Follow up: 3 months

## 2020-02-04 RX ORDER — DIGOXIN 250 MCG
250 TABLET ORAL DAILY
Qty: 90 TABLET | Refills: 3 | Status: SHIPPED | OUTPATIENT
Start: 2020-02-04

## 2020-02-04 NOTE — TELEPHONE ENCOUNTER
Needs refill on Digoxin 0.25 mg for a 90 dy supply to [facility].  Kevyn Ralph, CMA

## 2020-03-19 ENCOUNTER — CLINICAL SUPPORT (OUTPATIENT)
Dept: CARDIOLOGY | Facility: CLINIC | Age: 80
End: 2020-03-19

## 2020-03-19 DIAGNOSIS — I50.22 CHF (CONGESTIVE HEART FAILURE), NYHA CLASS III, CHRONIC, SYSTOLIC (HCC): ICD-10-CM

## 2020-03-19 PROCEDURE — 93295 DEV INTERROG REMOTE 1/2/MLT: CPT | Performed by: PHYSICIAN ASSISTANT

## 2020-03-19 PROCEDURE — 93296 REM INTERROG EVL PM/IDS: CPT | Performed by: PHYSICIAN ASSISTANT

## 2020-04-06 RX ORDER — SPIRONOLACTONE 25 MG/1
25 TABLET ORAL DAILY
Qty: 90 TABLET | Refills: 3 | Status: SHIPPED | OUTPATIENT
Start: 2020-04-06

## 2021-01-21 DIAGNOSIS — U07.1 COVID-19: ICD-10-CM

## 2021-01-21 RX ORDER — SODIUM CHLORIDE 9 MG/ML
INJECTION, SOLUTION INTRAVENOUS CONTINUOUS
Status: CANCELLED | OUTPATIENT
Start: 2021-01-21

## 2021-01-21 RX ORDER — SODIUM CHLORIDE 0.9 % (FLUSH) 0.9 %
10 SYRINGE (ML) INJECTION PRN
Status: CANCELLED | OUTPATIENT
Start: 2021-01-21

## 2021-01-21 RX ORDER — DIPHENHYDRAMINE HYDROCHLORIDE 50 MG/ML
50 INJECTION INTRAMUSCULAR; INTRAVENOUS ONCE
Status: CANCELLED | OUTPATIENT
Start: 2021-01-21 | End: 2021-01-21

## 2021-01-21 RX ORDER — METHYLPREDNISOLONE SODIUM SUCCINATE 125 MG/2ML
125 INJECTION, POWDER, LYOPHILIZED, FOR SOLUTION INTRAMUSCULAR; INTRAVENOUS ONCE
Status: CANCELLED | OUTPATIENT
Start: 2021-01-21 | End: 2021-01-21

## 2021-01-21 RX ORDER — ACETAMINOPHEN 325 MG/1
650 TABLET ORAL ONCE
Status: CANCELLED | OUTPATIENT
Start: 2021-01-21

## 2021-01-21 RX ORDER — DIPHENHYDRAMINE HYDROCHLORIDE 50 MG/ML
25 INJECTION INTRAMUSCULAR; INTRAVENOUS ONCE
Status: CANCELLED | OUTPATIENT
Start: 2021-01-21

## 2021-01-21 RX ORDER — EPINEPHRINE 1 MG/ML
0.3 INJECTION, SOLUTION, CONCENTRATE INTRAVENOUS PRN
Status: CANCELLED | OUTPATIENT
Start: 2021-01-21

## 2021-01-21 RX ORDER — SODIUM CHLORIDE 0.9 % (FLUSH) 0.9 %
5 SYRINGE (ML) INJECTION PRN
Status: CANCELLED | OUTPATIENT
Start: 2021-01-21

## 2021-01-21 RX ORDER — HEPARIN SODIUM (PORCINE) LOCK FLUSH IV SOLN 100 UNIT/ML 100 UNIT/ML
500 SOLUTION INTRAVENOUS PRN
Status: CANCELLED | OUTPATIENT
Start: 2021-01-21

## 2021-01-22 ENCOUNTER — HOSPITAL ENCOUNTER (OUTPATIENT)
Dept: INFUSION THERAPY | Age: 81
Setting detail: INFUSION SERIES
Discharge: HOME | End: 2021-01-22
Payer: COMMERCIAL

## 2021-01-22 VITALS
HEART RATE: 70 BPM | RESPIRATION RATE: 17 BRPM | SYSTOLIC BLOOD PRESSURE: 147 MMHG | OXYGEN SATURATION: 98 % | TEMPERATURE: 98 F | DIASTOLIC BLOOD PRESSURE: 63 MMHG

## 2021-01-22 DIAGNOSIS — U07.1 COVID-19: Primary | ICD-10-CM

## 2021-01-22 PROCEDURE — 2580000003 HC RX 258: Performed by: INTERNAL MEDICINE

## 2021-01-22 PROCEDURE — 6370000000 HC RX 637 (ALT 250 FOR IP): Performed by: INTERNAL MEDICINE

## 2021-01-22 PROCEDURE — 96365 THER/PROPH/DIAG IV INF INIT: CPT

## 2021-01-22 PROCEDURE — 6360000002 HC RX W HCPCS: Performed by: INTERNAL MEDICINE

## 2021-01-22 PROCEDURE — 2500000003 HC RX 250 WO HCPCS: Performed by: INTERNAL MEDICINE

## 2021-01-22 RX ORDER — SODIUM CHLORIDE 0.9 % (FLUSH) 0.9 %
10 SYRINGE (ML) INJECTION PRN
Status: CANCELLED | OUTPATIENT
Start: 2021-01-22

## 2021-01-22 RX ORDER — EPINEPHRINE 1 MG/ML
0.3 INJECTION, SOLUTION, CONCENTRATE INTRAVENOUS PRN
Status: CANCELLED | OUTPATIENT
Start: 2021-01-22

## 2021-01-22 RX ORDER — DIPHENHYDRAMINE HYDROCHLORIDE 50 MG/ML
25 INJECTION INTRAMUSCULAR; INTRAVENOUS ONCE
Status: CANCELLED | OUTPATIENT
Start: 2021-01-22

## 2021-01-22 RX ORDER — HEPARIN SODIUM (PORCINE) LOCK FLUSH IV SOLN 100 UNIT/ML 100 UNIT/ML
500 SOLUTION INTRAVENOUS PRN
Status: CANCELLED | OUTPATIENT
Start: 2021-01-22

## 2021-01-22 RX ORDER — SODIUM CHLORIDE 9 MG/ML
INJECTION, SOLUTION INTRAVENOUS CONTINUOUS
Status: CANCELLED | OUTPATIENT
Start: 2021-01-22

## 2021-01-22 RX ORDER — ACETAMINOPHEN 325 MG/1
650 TABLET ORAL ONCE
Status: COMPLETED | OUTPATIENT
Start: 2021-01-22 | End: 2021-01-22

## 2021-01-22 RX ORDER — METHYLPREDNISOLONE SODIUM SUCCINATE 125 MG/2ML
125 INJECTION, POWDER, LYOPHILIZED, FOR SOLUTION INTRAMUSCULAR; INTRAVENOUS ONCE
Status: CANCELLED | OUTPATIENT
Start: 2021-01-22 | End: 2021-01-22

## 2021-01-22 RX ORDER — ACETAMINOPHEN 325 MG/1
650 TABLET ORAL ONCE
Status: CANCELLED | OUTPATIENT
Start: 2021-01-22

## 2021-01-22 RX ORDER — SODIUM CHLORIDE 0.9 % (FLUSH) 0.9 %
5 SYRINGE (ML) INJECTION PRN
Status: CANCELLED | OUTPATIENT
Start: 2021-01-22

## 2021-01-22 RX ORDER — DIPHENHYDRAMINE HYDROCHLORIDE 50 MG/ML
25 INJECTION INTRAMUSCULAR; INTRAVENOUS ONCE
Status: COMPLETED | OUTPATIENT
Start: 2021-01-22 | End: 2021-01-22

## 2021-01-22 RX ORDER — SODIUM CHLORIDE 9 MG/ML
INJECTION, SOLUTION INTRAVENOUS CONTINUOUS
Status: ACTIVE | OUTPATIENT
Start: 2021-01-22 | End: 2021-01-22

## 2021-01-22 RX ORDER — DIPHENHYDRAMINE HYDROCHLORIDE 50 MG/ML
50 INJECTION INTRAMUSCULAR; INTRAVENOUS ONCE
Status: CANCELLED | OUTPATIENT
Start: 2021-01-22 | End: 2021-01-22

## 2021-01-22 RX ORDER — SODIUM CHLORIDE 0.9 % (FLUSH) 0.9 %
10 SYRINGE (ML) INJECTION PRN
Status: DISCONTINUED | OUTPATIENT
Start: 2021-01-22 | End: 2021-01-23 | Stop reason: HOSPADM

## 2021-01-22 RX ADMIN — SODIUM CHLORIDE: 9 INJECTION, SOLUTION INTRAVENOUS at 13:25

## 2021-01-22 RX ADMIN — IMDEVIMAB: 1332 INJECTION, SOLUTION, CONCENTRATE INTRAVENOUS at 13:25

## 2021-01-22 RX ADMIN — DIPHENHYDRAMINE HYDROCHLORIDE 25 MG: 50 INJECTION, SOLUTION INTRAMUSCULAR; INTRAVENOUS at 12:52

## 2021-01-22 RX ADMIN — ACETAMINOPHEN 650 MG: 325 TABLET ORAL at 12:52

## 2021-01-22 ASSESSMENT — PAIN SCALES - GENERAL: PAINLEVEL_OUTOF10: 0

## 2021-01-22 NOTE — PROGRESS NOTES
Patient tolerated monoclonal antibody infusion therapy without s/s of adverse reaction. Patient observed one hour post observation. Patient discharged to home. Discharge instructions provided. Patient verbalizes understanding of need to seek emergent medical care in the event of adverse reaction. Patient taken by wheelchair from infusion center to daughters vehicle with a RN present.  Electronically signed by Chinmay Al RN on 1/22/2021 at 3:36 PM

## 2021-01-22 NOTE — PROGRESS NOTES
Patient given FACT SHEET for EMERGENCY USE AUTHORIZATION FOR CASIRIVIMAB AND IMDEVIMAB. Pt verbalizes understanding of EMERGENCY USE AUTHORIZATION. Pt verbalizes understanding that  CASIRIVIMAB and IMDEVIMAB are invesitgational because they are still being studied. Pt verbalizes understanding regarding known safety and effectiveness and understands limited data is available regarding risks and benefits. Pt understands there is a continued need to self-isolate and continue all infection control measures. Pt. chooses to proceed with CASIRIVIMAB and IMDEVIMAB infusion therapy. Pt signed a copy of this information.  Electronically signed by Marco Antonio Mauricio RN on 1/22/2021 at 12:42 PM

## 2021-04-05 ENCOUNTER — HOSPITAL ENCOUNTER (OUTPATIENT)
Age: 81
Setting detail: SPECIMEN
Discharge: HOME | End: 2021-04-05
Payer: MEDICARE

## 2021-04-05 LAB
ANION GAP SERPL CALCULATED.3IONS-SCNC: 10 MMOL/L (ref 7–19)
BUN BLDV-MCNC: 53 MG/DL (ref 8–23)
CALCIUM SERPL-MCNC: 9.8 MG/DL (ref 8.8–10.2)
CHLORIDE BLD-SCNC: 101 MMOL/L (ref 98–111)
CO2: 28 MMOL/L (ref 22–29)
CREAT SERPL-MCNC: 1.8 MG/DL (ref 0.5–0.9)
GFR AFRICAN AMERICAN: 33
GFR NON-AFRICAN AMERICAN: 27
GLUCOSE BLD-MCNC: 153 MG/DL (ref 74–109)
POTASSIUM SERPL-SCNC: 4.6 MMOL/L (ref 3.5–5)
SODIUM BLD-SCNC: 139 MMOL/L (ref 136–145)

## 2021-04-05 PROCEDURE — 80048 BASIC METABOLIC PNL TOTAL CA: CPT

## 2021-04-05 PROCEDURE — 36415 COLL VENOUS BLD VENIPUNCTURE: CPT

## 2021-04-26 ENCOUNTER — TELEPHONE (OUTPATIENT)
Dept: CARDIOLOGY | Facility: CLINIC | Age: 81
End: 2021-04-26

## 2021-04-26 NOTE — TELEPHONE ENCOUNTER
Patient called in stating she received the [employer] letter and she had some questions. I explain to her that was just to inform her that once the battery does reach replacement her Model of device does not have the guaranteed 3 months like other devices.     Patient has not had an interrogation in awhile and she unplugged her Carelink monitor because she is in Hospice care due to a failing kidneys . She stated she wanted to talk to Dr. Gandhi but doesn't want to make an appointment I explained to her that was the only way to talk to him personally. She stated she was going to talk to Dr. Dean to get his advise on turning her Tachy therapy off.    I asked her to plug in her Carelink monitor. She voiced understanding.

## 2021-07-13 ENCOUNTER — TELEPHONE (OUTPATIENT)
Dept: CARDIOLOGY | Facility: CLINIC | Age: 81
End: 2021-07-13

## 2021-07-13 NOTE — TELEPHONE ENCOUNTER
Patient called in to report that her AICD is beeping once a day in the morning.  RN explained that this is most likely because it is time to change the battery.  Of note, patient's device is part of the shortened RRT to EOS advisory, meaning that she does not necessarily have three months of function remaining on her device.  Patient states that she does not want a battery change and wants to turn off therapy because she is in hospice care.  RN will obtain order from Dr. Gandhi to turn off tachy therapy from device and will contact [employer] [facility] to do so.  Patient is receiving hospice care through [facility].  RN will contact patient with update after receiving order from Dr. Gandhi.

## 2021-07-14 NOTE — TELEPHONE ENCOUNTER
RN notified Rei Bai, patient's hospice nurse, that an order has been received to turn off patient's tachy therapy.  Guerrero Antonio from [employer] notified.  He and Rei will coordinate a time to meet at patient's home to turn off tachy therapy.  Message left on Rei's phone with Guerrero's contact information and RN's contact information.

## 2022-03-21 ENCOUNTER — TELEPHONE (OUTPATIENT)
Dept: CARDIOLOGY | Facility: CLINIC | Age: 82
End: 2022-03-21

## 2022-03-21 NOTE — TELEPHONE ENCOUNTER
Nathaniel Madrigal, [employer] rep, notified that patient needs device checked.  RN provided him with patient's hospice RN's contact information so he can set up a time to check patient's device.  Nathaniel will notify RN of device check results.

## 2022-03-21 NOTE — TELEPHONE ENCOUNTER
"RN received notification from [employer] that patient called them reporting \"being woken up at night by potential shocks. Patient reports their arms fly out after these shocks.\"  Of note, patient's ICD was deactivated in July 2021 s/t patient being on hospice care.  Patient also elected to not have a generator change because of being on hospice care.  Patient's remote monitor has been disconnected since 2021, so no new reports have been received since tachy therapy was turned off.  Patient states that it usually happens when she lays down at night and reports that she feels like her heart rate slows down.  She then receives a \"little shock\" that sometimes \"moves her arm\".  When asked how often this occurs, patient states \"not very often\".  Patient is still being cared for by [facility] and states that her nurse's name is Marleen.  Hospice nurse is expected to see patient Tuesday morning this week.  RN will contact hospice nurse to see if a [employer] rep can come to patient's home to check her device.    "

## 2022-03-25 NOTE — TELEPHONE ENCOUNTER
Patient's AICD continues to function normally with tachy therapy turned off.  No issues noted with device.  [employer] notified.  Report attached to this encounter.

## 2023-06-02 NOTE — TELEPHONE ENCOUNTER
Needs refill for digoxin 250 mg for a 90 day supply to [facility] in [place].  Kevyn Ralph, CMA    The skin of the bilateral groins was clipped and prepped in the usual sterile manner. (If not otherwise specified, skin prep was bilateral.)

## 2023-09-11 ENCOUNTER — CLINICAL SUPPORT NO REQUIREMENTS (OUTPATIENT)
Dept: CARDIOLOGY | Facility: CLINIC | Age: 83
End: 2023-09-11
Payer: MEDICARE

## 2023-09-11 ENCOUNTER — OFFICE VISIT (OUTPATIENT)
Dept: CARDIOLOGY | Facility: CLINIC | Age: 83
End: 2023-09-11
Payer: MEDICARE

## 2023-09-11 VITALS
WEIGHT: 179 LBS | DIASTOLIC BLOOD PRESSURE: 60 MMHG | SYSTOLIC BLOOD PRESSURE: 128 MMHG | BODY MASS INDEX: 31.71 KG/M2 | HEIGHT: 63 IN | HEART RATE: 65 BPM | OXYGEN SATURATION: 99 %

## 2023-09-11 DIAGNOSIS — I25.709 CORONARY ARTERY DISEASE INVOLVING CORONARY BYPASS GRAFT OF NATIVE HEART WITH ANGINA PECTORIS: ICD-10-CM

## 2023-09-11 DIAGNOSIS — I50.42 CHRONIC COMBINED SYSTOLIC AND DIASTOLIC CHF (CONGESTIVE HEART FAILURE): ICD-10-CM

## 2023-09-11 DIAGNOSIS — I49.01 VF (VENTRICULAR FIBRILLATION): ICD-10-CM

## 2023-09-11 DIAGNOSIS — I48.11 LONGSTANDING PERSISTENT ATRIAL FIBRILLATION: ICD-10-CM

## 2023-09-11 DIAGNOSIS — I50.42 CHRONIC COMBINED SYSTOLIC AND DIASTOLIC CHF (CONGESTIVE HEART FAILURE): Primary | ICD-10-CM

## 2023-09-11 DIAGNOSIS — Z95.810 AICD (AUTOMATIC CARDIOVERTER/DEFIBRILLATOR) PRESENT: ICD-10-CM

## 2023-09-11 DIAGNOSIS — I10 ESSENTIAL HYPERTENSION: ICD-10-CM

## 2023-09-11 DIAGNOSIS — Z95.810 AICD (AUTOMATIC CARDIOVERTER/DEFIBRILLATOR) PRESENT: Primary | ICD-10-CM

## 2023-09-11 PROBLEM — N18.32 STAGE 3B CHRONIC KIDNEY DISEASE: Status: ACTIVE | Noted: 2019-02-05

## 2023-09-11 PROBLEM — E66.09 CLASS 1 OBESITY DUE TO EXCESS CALORIES WITH SERIOUS COMORBIDITY AND BODY MASS INDEX (BMI) OF 31.0 TO 31.9 IN ADULT: Status: ACTIVE | Noted: 2018-04-26

## 2023-09-11 PROBLEM — E66.811 CLASS 1 OBESITY DUE TO EXCESS CALORIES WITH SERIOUS COMORBIDITY AND BODY MASS INDEX (BMI) OF 31.0 TO 31.9 IN ADULT: Status: ACTIVE | Noted: 2018-04-26

## 2023-09-11 PROBLEM — I48.20 ATRIAL FIBRILLATION, CHRONIC: Status: RESOLVED | Noted: 2019-10-28 | Resolved: 2023-09-11

## 2023-09-11 RX ORDER — SPIRONOLACTONE 25 MG/1
12.5 TABLET ORAL DAILY
Qty: 45 TABLET | Refills: 3 | Status: SHIPPED | OUTPATIENT
Start: 2023-09-11

## 2023-09-11 RX ORDER — METOPROLOL SUCCINATE 25 MG/1
25 TABLET, EXTENDED RELEASE ORAL DAILY
Qty: 90 TABLET | Refills: 3 | Status: SHIPPED | OUTPATIENT
Start: 2023-09-11

## 2023-09-11 RX ORDER — AMLODIPINE BESYLATE 5 MG/1
5 TABLET ORAL DAILY
COMMUNITY

## 2023-09-11 RX ORDER — OLMESARTAN MEDOXOMIL 5 MG/1
40 TABLET ORAL DAILY
Qty: 90 TABLET | Refills: 3 | Status: SHIPPED | OUTPATIENT
Start: 2023-09-11 | End: 2023-09-13 | Stop reason: SDUPTHER

## 2023-09-11 RX ORDER — SENNOSIDES A AND B 8.6 MG/1
1 TABLET, FILM COATED ORAL DAILY
COMMUNITY

## 2023-09-11 RX ORDER — FAMOTIDINE 40 MG/1
40 TABLET, FILM COATED ORAL DAILY
COMMUNITY

## 2023-09-11 RX ORDER — GLIMEPIRIDE 1 MG/1
1 TABLET ORAL
COMMUNITY

## 2023-09-11 NOTE — PROGRESS NOTES
Dual Chamber AICD Interrogation Report  IN OFFICE    September 11, 2023    Primary Cardiologist: Macarena  : [employer] Model: Evera XT  [id number]  Implant date: 5/28/2015    Reason for evaluation:  Patient re-establishing care after being discharged from hospice care.    Indication for AICD: Ventricular Fibrillation    Interrogation performed by:  Maday Chaidez RN    Measurements  Atrial sensing - P wave: 0.1 mV  Atrial threshold: N/P - in AF  Atrial lead impedance: 437 ohms  Ventricular sensing - R wave: 4.1 mV  Ventricular threshold: 2 V @ 0.4 ms  Ventricular lead impedance:  627 ohms  Shock impedance:  RV- 39 ohms   SVC- 61 ohms    Manual sensing and threshold testing performed:  Yes      Diagnostic Data  Atrial paced: 0 %  Ventricular paced: 48.7 %    Episodes/Alerts: 100% AF burden since at least August 2022.  Average ventricular rates controlled.  HR when pacing inhibited between 40-50 bpm.  Tachy therapy was turned off in July 2021.  Patient elects to not have a generator change performed.      Battery status:  End of Service (EOS) since 9/30/2022      Final Parameters  Mode: VVI  Lower rate: 60 bpm   Ventricular - Amplitude: 3.25 V Pulse width: 0.4 ms Sensitivity: 0.3 mV     Changes made: Dr. Fiore's information added to device.    Conclusions: Normal AICD Function, Stable Pacing and Sensing Thresholds, and Battery at EOS but still pacing at this time    Remote Monitor:  No.    Follow up: No follow up per patient request.

## 2023-09-11 NOTE — PROGRESS NOTES
Reason for Visit: Reestablish cardiovascular care.    HPI:  Cary Shah is a 83 y.o. female is here today [facility]  She previously followed with Dr. Gandhi but has not been seen in cardiology clinic since 10/28/2019.  She has a history of chronic systolic CHF, coronary artery disease with two-vessel CABG in , persistent atrial fibrillation, AICD in place, hyperlipidemia, CKD, hypertension, and diabetes.  She elected to go off Hospice, primarily in order to get her foot fixed.  She is therefore reestablishing care with her prior providers.  AICD reached end-of-life in 2022.  She is not interested in generator change and elects to allow this to .      She has shortness of breath, particularly when laying flat.  She is interested in getting back on oxygen, since it was taken away by Hospice.  Her oxygen saturation is 99% today.  Her main complaint is pain, primarily in her left foot where she recently had surgery on 2023.  She has been off pain medicines for the last 2 weeks since she is not getting them anymore from hospice.  She reports some withdrawal symptoms from being off narcotics.  She is trying to get set up in pain management.    Previous Cardiac Testing and Procedures:  -Echo (2014) EF 45 to 50%, mild pulmonary hypertension, moderate LA enlargement, no significant valve dysfunction    Lab data:  -CBC (2023) WBC 7.9, hemoglobin 8.5, platelets 262  -Hemoglobin A1c (2023) 7.1%  -BMP (2023) creatinine 1.43, GFR 35, potassium 4.1, sodium 141    Patient Active Problem List   Diagnosis    Chronic combined systolic and diastolic CHF (congestive heart failure)    Coronary artery disease involving coronary bypass graft of native heart with angina pectoris    Mixed hyperlipidemia    Longstanding persistent atrial fibrillation    S/P CABG x 2    Status post coronary artery stent placement    History of vaginal bleeding     Type 2 diabetes mellitus with complication, with long-term current use of insulin    Class 1 obesity due to excess calories with serious comorbidity and body mass index (BMI) of 31.0 to 31.9 in adult    Essential hypertension    AICD (automatic cardioverter/defibrillator) present    Stage 3b chronic kidney disease    Cellulitis of lower extremity       Social History     Tobacco Use    Smoking status: Some Days     Packs/day: 0.50     Types: Cigarettes     Start date: 1960    Smokeless tobacco: Never    Tobacco comments:     1 cigarette every now and then when gets a craving for one.  does not want to quit yet.   Vaping Use    Vaping Use: Never used   Substance Use Topics    Alcohol use: No    Drug use: No       Family History   Problem Relation Age of Onset    Coronary artery disease Mother     Heart attack Mother     Coronary artery disease Father     Heart attack Father     Heart disease Sister     Heart attack Sister     Coronary artery disease Sister     Heart attack Brother     Heart disease Brother     Coronary artery disease Brother     No Known Problems Maternal Grandmother     No Known Problems Maternal Grandfather     No Known Problems Paternal Grandmother     No Known Problems Paternal Grandfather     Heart disease Brother     Heart disease Brother     Heart attack Brother        The following portions of the patient's history were reviewed and updated as appropriate: allergies, current medications, past family history, past medical history, past social history, past surgical history, and problem list.      Current Outpatient Medications:     allopurinol (ZYLOPRIM) 100 MG tablet, Take 1 tablet by mouth Daily., Disp: , Rfl:     amLODIPine (NORVASC) 5 MG tablet, Take 1 tablet by mouth Daily., Disp: , Rfl:     aspirin  MG tablet, Take 1 tablet by mouth Daily., Disp: , Rfl:     calcitriol (ROCALTROL) 0.25 MCG capsule, TK ONE C PO  QD, Disp: , Rfl: 5    Camphor-Eucalyptus-Menthol (VICKS VAPORUB EX),  "Apply  topically., Disp: , Rfl:     digoxin (DIGOX) 250 MCG tablet, Take 1 tablet by mouth Daily., Disp: 90 tablet, Rfl: 3    famotidine (PEPCID) 40 MG tablet, Take 1 tablet by mouth Daily., Disp: , Rfl:     furosemide (LASIX) 40 MG tablet, Take 1 tablet by mouth 2 (Two) Times a Day., Disp: , Rfl:     glimepiride (AMARYL) 1 MG tablet, Take 1 tablet by mouth Every Morning Before Breakfast., Disp: , Rfl:     metoprolol succinate XL (TOPROL-XL) 25 MG 24 hr tablet, Take 1 tablet by mouth Daily., Disp: 90 tablet, Rfl: 3    olmesartan (BENICAR) 5 MG tablet, Take 8 tablets by mouth Daily., Disp: 90 tablet, Rfl: 3    senna 8.6 MG tablet, Take 1 tablet by mouth Daily., Disp: , Rfl:     spironolactone (ALDACTONE) 25 MG tablet, Take 0.5 tablets by mouth Daily., Disp: 45 tablet, Rfl: 3    levothyroxine (SYNTHROID, LEVOTHROID) 175 MCG tablet, Take 1 tablet by mouth Daily. (Patient not taking: Reported on 9/11/2023), Disp: , Rfl:     Review of Systems   Constitutional: Negative for chills and fever.   Cardiovascular:  Positive for dyspnea on exertion and orthopnea. Negative for chest pain and paroxysmal nocturnal dyspnea.   Respiratory:  Negative for cough and shortness of breath.    Skin:  Negative for rash.   Musculoskeletal:  Positive for joint pain.   Gastrointestinal:  Positive for bloating and flatus. Negative for abdominal pain and heartburn.   Genitourinary:  Positive for dysuria and frequency.   Neurological:  Negative for dizziness and numbness.     Objective   /60 (BP Location: Right arm, Patient Position: Sitting, Cuff Size: Adult)   Pulse 65   Ht 160 cm (62.99\")   Wt 81.2 kg (179 lb)   SpO2 99%   BMI 31.72 kg/m²   Constitutional:       Appearance: Well-developed.   HENT:      Head: Normocephalic and atraumatic.   Pulmonary:      Effort: Pulmonary effort is normal.      Breath sounds: Normal breath sounds.   Cardiovascular:      Normal rate. Regular rhythm.      Murmurs: There is no murmur.      No gallop.  " No click.   Edema:     Peripheral edema absent.   Skin:     General: Skin is warm and dry.   Neurological:      Mental Status: Alert and oriented to person, place, and time.       ECG 12 Lead    Date/Time: 2023 1:25 PM  Performed by: Kan Fiore MD  Authorized by: Kan Fiore MD   Comparison: compared with previous ECG from 10/28/2019  Similar to previous ECG  Rhythm: atrial fibrillation and paced  Pacin% capture and ventricular pacing        ICD-10-CM ICD-9-CM   1. Chronic combined systolic and diastolic CHF (congestive heart failure)  I50.42 428.42     428.0   2. Longstanding persistent atrial fibrillation  I48.11 427.31   3. Coronary artery disease involving coronary bypass graft of native heart with angina pectoris  I25.709 414.05     413.9   4. AICD (automatic cardioverter/defibrillator) present  Z95.810 V45.02   5. Essential hypertension  I10 401.9         Assessment/Plan:  1.  Chronic combined systolic and diastolic CHF: EF 45 to 50% on last echo from .  Patient currently reports symptoms are of orthopnea.  Order repeat echo as she has not had any LV assessment since 2014 and does report persistent symptoms.  Check a chest x-ray.  Continue metoprolol succinate, olmesartan, spironolactone, and furosemide.  Will consider adding SGLT2 inhibitor at follow-up.    2.  Coronary artery disease: Two-vessel CABG in .  No current chest pain symptoms.  She is not interested in any further invasive testing or treatment at this time.  Continue aspirin and metoprolol.  Plan to discuss the risk and benefits of statin therapy at follow-up.    3.  Persistent atrial fibrillation: Previously taken off anticoagulation due to bleeding.  Declined consideration for left atrial appendage occlusion with Watchman device.  Understands risks of thromboembolic event.  Continue aspirin.    4.  AICD in place: Device reached end-of-life approximately 1 year ago.  Currently RV paced just under 50% of the time.   She is not interested in generator change and understands risks.    5.  Essential hypertension: Blood pressure is acceptable today.  Continue current therapy and monitor.

## 2023-09-11 NOTE — LETTER
2023     AL Carmichael  [address] [facility] [address]    Patient: Cary Shah   YOB: 1940   Date of Visit: 2023       Dear AL Carmichael,    Thank you for referring Cary Shah to me for evaluation. Below is a copy of my consult note.    If you have questions, please do not hesitate to call me. I look forward to following Cary along with you.         Sincerely,        Kan Fiore MD        CC: No Recipients      Reason for Visit: Reestablish cardiovascular care.    HPI:  Cary Shah is a 83 y.o. female is [facility]  She previously followed with Dr. Gandhi but has not been seen in cardiology clinic since 10/28/2019.  She has a history of chronic systolic CHF, coronary artery disease with two-vessel CABG in , persistent atrial fibrillation, AICD in place, hyperlipidemia, CKD, hypertension, and diabetes.  She elected to go off Hospice, primarily in order to get her foot fixed.  She is therefore reestablishing care with her prior providers.  AICD reached end-of-life in 2022.  She is not interested in generator change and elects to allow this to .      She has shortness of breath, particularly when laying flat.  She is interested in getting back on oxygen, since it was taken away by Hospice.  Her oxygen saturation is 99% today.  Her main complaint is pain, primarily in her left foot where she recently had surgery on 2023.  She has been off pain medicines for the last 2 weeks since she is not getting them anymore from hospice.  She reports some withdrawal symptoms from being off narcotics.  She is trying to get set up in pain management.    Previous Cardiac Testing and Procedures:  -Echo (2014) EF 45 to 50%, mild pulmonary hypertension, moderate LA enlargement, no significant valve dysfunction    Lab data:  -CBC (2023) WBC 7.9, hemoglobin 8.5,  platelets 262  -Hemoglobin A1c (8/29/2023) 7.1%  -BMP (8/29/2023) creatinine 1.43, GFR 35, potassium 4.1, sodium 141    Patient Active Problem List   Diagnosis   • Chronic combined systolic and diastolic CHF (congestive heart failure)   • Coronary artery disease involving coronary bypass graft of native heart with angina pectoris   • Mixed hyperlipidemia   • Longstanding persistent atrial fibrillation   • S/P CABG x 2   • Status post coronary artery stent placement   • History of vaginal bleeding   • Type 2 diabetes mellitus with complication, with long-term current use of insulin   • Class 1 obesity due to excess calories with serious comorbidity and body mass index (BMI) of 31.0 to 31.9 in adult   • Essential hypertension   • AICD (automatic cardioverter/defibrillator) present   • Stage 3b chronic kidney disease   • Cellulitis of lower extremity       Social History     Tobacco Use   • Smoking status: Some Days     Packs/day: 0.50     Types: Cigarettes     Start date: 1960   • Smokeless tobacco: Never   • Tobacco comments:     1 cigarette every now and then when gets a craving for one.  does not want to quit yet.   Vaping Use   • Vaping Use: Never used   Substance Use Topics   • Alcohol use: No   • Drug use: No       Family History   Problem Relation Age of Onset   • Coronary artery disease Mother    • Heart attack Mother    • Coronary artery disease Father    • Heart attack Father    • Heart disease Sister    • Heart attack Sister    • Coronary artery disease Sister    • Heart attack Brother    • Heart disease Brother    • Coronary artery disease Brother    • No Known Problems Maternal Grandmother    • No Known Problems Maternal Grandfather    • No Known Problems Paternal Grandmother    • No Known Problems Paternal Grandfather    • Heart disease Brother    • Heart disease Brother    • Heart attack Brother        The following portions of the patient's history were reviewed and updated as appropriate: allergies,  current medications, past family history, past medical history, past social history, past surgical history, and problem list.      Current Outpatient Medications:   •  allopurinol (ZYLOPRIM) 100 MG tablet, Take 1 tablet by mouth Daily., Disp: , Rfl:   •  amLODIPine (NORVASC) 5 MG tablet, Take 1 tablet by mouth Daily., Disp: , Rfl:   •  aspirin  MG tablet, Take 1 tablet by mouth Daily., Disp: , Rfl:   •  calcitriol (ROCALTROL) 0.25 MCG capsule, TK ONE C PO  QD, Disp: , Rfl: 5  •  Camphor-Eucalyptus-Menthol (VICKS VAPORUB EX), Apply  topically., Disp: , Rfl:   •  digoxin (DIGOX) 250 MCG tablet, Take 1 tablet by mouth Daily., Disp: 90 tablet, Rfl: 3  •  famotidine (PEPCID) 40 MG tablet, Take 1 tablet by mouth Daily., Disp: , Rfl:   •  furosemide (LASIX) 40 MG tablet, Take 1 tablet by mouth 2 (Two) Times a Day., Disp: , Rfl:   •  glimepiride (AMARYL) 1 MG tablet, Take 1 tablet by mouth Every Morning Before Breakfast., Disp: , Rfl:   •  metoprolol succinate XL (TOPROL-XL) 25 MG 24 hr tablet, Take 1 tablet by mouth Daily., Disp: 90 tablet, Rfl: 3  •  olmesartan (BENICAR) 5 MG tablet, Take 8 tablets by mouth Daily., Disp: 90 tablet, Rfl: 3  •  senna 8.6 MG tablet, Take 1 tablet by mouth Daily., Disp: , Rfl:   •  spironolactone (ALDACTONE) 25 MG tablet, Take 0.5 tablets by mouth Daily., Disp: 45 tablet, Rfl: 3  •  levothyroxine (SYNTHROID, LEVOTHROID) 175 MCG tablet, Take 1 tablet by mouth Daily. (Patient not taking: Reported on 9/11/2023), Disp: , Rfl:     Review of Systems   Constitutional: Negative for chills and fever.   Cardiovascular:  Positive for dyspnea on exertion and orthopnea. Negative for chest pain and paroxysmal nocturnal dyspnea.   Respiratory:  Negative for cough and shortness of breath.    Skin:  Negative for rash.   Musculoskeletal:  Positive for joint pain.   Gastrointestinal:  Positive for bloating and flatus. Negative for abdominal pain and heartburn.   Genitourinary:  Positive for dysuria and  "frequency.   Neurological:  Negative for dizziness and numbness.     Objective  /60 (BP Location: Right arm, Patient Position: Sitting, Cuff Size: Adult)   Pulse 65   Ht 160 cm (62.99\")   Wt 81.2 kg (179 lb)   SpO2 99%   BMI 31.72 kg/m²   Constitutional:       Appearance: Well-developed.   HENT:      Head: Normocephalic and atraumatic.   Pulmonary:      Effort: Pulmonary effort is normal.      Breath sounds: Normal breath sounds.   Cardiovascular:      Normal rate. Regular rhythm.      Murmurs: There is no murmur.      No gallop.  No click.   Edema:     Peripheral edema absent.   Skin:     General: Skin is warm and dry.   Neurological:      Mental Status: Alert and oriented to person, place, and time.       ECG 12 Lead    Date/Time: 2023 1:25 PM  Performed by: Kan Fiore MD  Authorized by: Kan Fiore MD   Comparison: compared with previous ECG from 10/28/2019  Similar to previous ECG  Rhythm: atrial fibrillation and paced  Pacin% capture and ventricular pacing        ICD-10-CM ICD-9-CM   1. Chronic combined systolic and diastolic CHF (congestive heart failure)  I50.42 428.42     428.0   2. Longstanding persistent atrial fibrillation  I48.11 427.31   3. Coronary artery disease involving coronary bypass graft of native heart with angina pectoris  I25.709 414.05     413.9   4. AICD (automatic cardioverter/defibrillator) present  Z95.810 V45.02   5. Essential hypertension  I10 401.9         Assessment/Plan:  1.  Chronic combined systolic and diastolic CHF: EF 45 to 50% on last echo from .  Patient currently reports symptoms are of orthopnea.  Order repeat echo as she has not had any LV assessment since 2014 and does report persistent symptoms.  Check a chest x-ray.  Continue metoprolol succinate, olmesartan, spironolactone, and furosemide.  Will consider adding SGLT2 inhibitor at follow-up.    2.  Coronary artery disease: Two-vessel CABG in .  No current chest pain symptoms.  " She is not interested in any further invasive testing or treatment at this time.  Continue aspirin and metoprolol.  Plan to discuss the risk and benefits of statin therapy at follow-up.    3.  Persistent atrial fibrillation: Previously taken off anticoagulation due to bleeding.  Declined consideration for left atrial appendage occlusion with Watchman device.  Understands risks of thromboembolic event.  Continue aspirin.    4.  AICD in place: Device reached end-of-life approximately 1 year ago.  Currently RV paced just under 50% of the time.  She is not interested in generator change and understands risks.    5.  Essential hypertension: Blood pressure is acceptable today.  Continue current therapy and monitor.

## 2023-09-13 ENCOUNTER — TELEPHONE (OUTPATIENT)
Dept: CARDIOLOGY | Facility: CLINIC | Age: 83
End: 2023-09-13
Payer: MEDICARE

## 2023-09-13 RX ORDER — OLMESARTAN MEDOXOMIL 40 MG/1
40 TABLET ORAL DAILY
Qty: 30 TABLET | Refills: 11 | Status: SHIPPED | OUTPATIENT
Start: 2023-09-13

## 2023-09-13 NOTE — TELEPHONE ENCOUNTER
Daughter called to advise that Benicar should be 40mg daily, and wants sent to [facility].    Please send in new prescription for 40 mg.

## 2023-09-13 NOTE — TELEPHONE ENCOUNTER
----- Message from Kan Fiore MD sent at 9/13/2023 10:30 AM CDT -----  I refilled it last visit but any other changes were inadvertent.  What dose is she taking at home?  ----- Message -----  From: Rocio Simms MA  Sent: 9/11/2023   2:33 PM CDT  To: Kan Fiore MD    On this med, do you mean for her to take 8 tablets daily?

## 2023-09-13 NOTE — PROGRESS NOTES
I have reviewed the notes, assessments, and/or procedures performed by Maday Chaidez, I concur with her/his documentation of Cary Shah.

## 2023-09-20 ENCOUNTER — TELEPHONE (OUTPATIENT)
Dept: CARDIOLOGY | Facility: CLINIC | Age: 83
End: 2023-09-20
Payer: MEDICARE

## 2023-09-20 RX ORDER — DIGOXIN 125 MCG
125 TABLET ORAL DAILY
Qty: 90 TABLET | Refills: 3 | Status: SHIPPED | OUTPATIENT
Start: 2023-09-20

## 2023-09-20 NOTE — TELEPHONE ENCOUNTER
Caller: Christopher Villanueva    Relationship: Emergency Contact    Best call back number: [phone]     What medications are you currently taking:   Current Outpatient Medications on File Prior to Visit   Medication Sig Dispense Refill    allopurinol (ZYLOPRIM) 100 MG tablet Take 1 tablet by mouth Daily.      amLODIPine (NORVASC) 5 MG tablet Take 1 tablet by mouth Daily.      aspirin  MG tablet Take 1 tablet by mouth Daily.      calcitriol (ROCALTROL) 0.25 MCG capsule TK ONE C PO  QD  5    Camphor-Eucalyptus-Menthol (VICKS VAPORUB EX) Apply  topically.      digoxin (DIGOX) 250 MCG tablet Take 1 tablet by mouth Daily. 90 tablet 3    famotidine (PEPCID) 40 MG tablet Take 1 tablet by mouth Daily.      furosemide (LASIX) 40 MG tablet Take 1 tablet by mouth 2 (Two) Times a Day.      glimepiride (AMARYL) 1 MG tablet Take 1 tablet by mouth Every Morning Before Breakfast.      levothyroxine (SYNTHROID, LEVOTHROID) 175 MCG tablet Take 1 tablet by mouth Daily. (Patient not taking: Reported on 9/11/2023)      metoprolol succinate XL (TOPROL-XL) 25 MG 24 hr tablet Take 1 tablet by mouth Daily. 90 tablet 3    olmesartan (BENICAR) 40 MG tablet Take 1 tablet by mouth Daily. 30 tablet 11    senna 8.6 MG tablet Take 1 tablet by mouth Daily.      spironolactone (ALDACTONE) 25 MG tablet Take 0.5 tablets by mouth Daily. 45 tablet 3     No current facility-administered medications on file prior to visit.          When did you start taking these medications: HAS NOT TAKEN YET    Which medication are you concerned about: METOPROLOL    Who prescribed you this medication: DR. RICKS    What are your concerns: CHRISTOPHER STATES THAT THE NEW PRESCRIPTION OF METOPROLOL IS DIFFERENT THAN WHAT SHE WAS TAKING BEFORE AND WERE WANTING TO CONSULT WITH DR. RICKS ON WHAT THE DIFFERENCE WAS AND WHY IT WAS CHANGED BEFORE THE PATIENT STATES TAKING THIS VERSION    How long have you had these concerns: SINCE 9.11.23

## 2023-09-20 NOTE — TELEPHONE ENCOUNTER
Call returned to [place]. I told her that we did not change dose of her meds. We just refilled what she was already taking

## 2023-12-05 ENCOUNTER — TELEPHONE (OUTPATIENT)
Dept: CARDIOLOGY | Facility: CLINIC | Age: 83
End: 2023-12-05
Payer: MEDICARE

## 2023-12-05 NOTE — TELEPHONE ENCOUNTER
I called this pt and spoke with daughter Eliane. Pt has a 2 mo f/u with Dr Fiore on 12/8 pending results of cxr and echo. Pt canceled her appt on both. Daughter tells me that pt does not want to do the echo and cxr ordered at her last appt. She only wants to see Dr Fiore for medication refills. Do you still want to see her on 12/8 or insists patient to get them done?

## 2023-12-08 ENCOUNTER — OFFICE VISIT (OUTPATIENT)
Dept: CARDIOLOGY | Facility: CLINIC | Age: 83
End: 2023-12-08
Payer: MEDICARE

## 2023-12-08 VITALS
OXYGEN SATURATION: 99 % | SYSTOLIC BLOOD PRESSURE: 108 MMHG | HEART RATE: 76 BPM | DIASTOLIC BLOOD PRESSURE: 44 MMHG | BODY MASS INDEX: 31.18 KG/M2 | WEIGHT: 176 LBS | HEIGHT: 63 IN

## 2023-12-08 DIAGNOSIS — Z95.810 AICD (AUTOMATIC CARDIOVERTER/DEFIBRILLATOR) PRESENT: ICD-10-CM

## 2023-12-08 DIAGNOSIS — I48.11 LONGSTANDING PERSISTENT ATRIAL FIBRILLATION: ICD-10-CM

## 2023-12-08 DIAGNOSIS — I50.42 CHRONIC COMBINED SYSTOLIC AND DIASTOLIC CHF (CONGESTIVE HEART FAILURE): Primary | ICD-10-CM

## 2023-12-08 DIAGNOSIS — I10 ESSENTIAL HYPERTENSION: ICD-10-CM

## 2023-12-08 DIAGNOSIS — Z79.4 TYPE 2 DIABETES MELLITUS WITH COMPLICATION, WITH LONG-TERM CURRENT USE OF INSULIN: ICD-10-CM

## 2023-12-08 DIAGNOSIS — E11.8 TYPE 2 DIABETES MELLITUS WITH COMPLICATION, WITH LONG-TERM CURRENT USE OF INSULIN: ICD-10-CM

## 2023-12-08 DIAGNOSIS — I25.810 CORONARY ARTERY DISEASE INVOLVING CORONARY BYPASS GRAFT OF NATIVE HEART WITHOUT ANGINA PECTORIS: ICD-10-CM

## 2023-12-08 NOTE — PROGRESS NOTES
Reason for Visit: cardiovascular follow up.    HPI:  Cary Shah is a 83 y.o. female is here today for follow-up.  She was last in cardiology clinic on 9/11/2023 to reestablish cardiovascular care due to history of CHF.  Echo was repeated at last visit due to symptoms of orthopnea and last echo was done in 2014.  She elected not to do have this done.  Her defibrillator bothers her at times, typically when she is laying down at night.  She can feel the wires going up to her clavicle.  She has a foot wound that is slowly healing and is currently wearing a boot.  She was doing therapy at her nursing home and felt discomfort from using different muscles.  Her diabetes has been better controlled recently.      Previous Cardiac Testing and Procedures:  -Echo (11/14/2014) EF 45 to 50%, mild pulmonary hypertension, moderate LA enlargement, no significant valve dysfunction     Lab data:  -CBC (8/29/2023) WBC 7.9, hemoglobin 8.5, platelets 262  -Hemoglobin A1c (8/29/2023) 7.1%  -BMP (8/29/2023) creatinine 1.43, GFR 35, potassium 4.1, sodium 141    Patient Active Problem List   Diagnosis    Chronic combined systolic and diastolic CHF (congestive heart failure)    Coronary artery disease involving coronary bypass graft of native heart without angina pectoris    Mixed hyperlipidemia    Longstanding persistent atrial fibrillation    S/P CABG x 2    Status post coronary artery stent placement    History of vaginal bleeding    Type 2 diabetes mellitus with complication, with long-term current use of insulin    Class 1 obesity due to excess calories with serious comorbidity and body mass index (BMI) of 31.0 to 31.9 in adult    Essential hypertension    AICD (automatic cardioverter/defibrillator) present    Stage 3b chronic kidney disease    Cellulitis of lower extremity       Social History     Tobacco Use    Smoking status: Some Days     Packs/day: .5     Types: Cigarettes     Start date: 1960    Smokeless tobacco: Never     Tobacco comments:     1 cigarette every now and then when gets a craving for one.  does not want to quit yet.   Vaping Use    Vaping Use: Never used   Substance Use Topics    Alcohol use: No    Drug use: No       Family History   Problem Relation Age of Onset    Coronary artery disease Mother     Heart attack Mother     Coronary artery disease Father     Heart attack Father     Heart disease Sister     Heart attack Sister     Coronary artery disease Sister     Heart attack Brother     Heart disease Brother     Coronary artery disease Brother     No Known Problems Maternal Grandmother     No Known Problems Maternal Grandfather     No Known Problems Paternal Grandmother     No Known Problems Paternal Grandfather     Heart disease Brother     Heart disease Brother     Heart attack Brother        The following portions of the patient's history were reviewed and updated as appropriate: allergies, current medications, past family history, past medical history, past social history, past surgical history, and problem list.      Current Outpatient Medications:     allopurinol (ZYLOPRIM) 100 MG tablet, Take 1 tablet by mouth Daily., Disp: , Rfl:     amLODIPine (NORVASC) 5 MG tablet, Take 1 tablet by mouth Daily., Disp: , Rfl:     aspirin  MG tablet, Take 1 tablet by mouth Daily., Disp: , Rfl:     calcitriol (ROCALTROL) 0.25 MCG capsule, TK ONE C PO  QD, Disp: , Rfl: 5    Camphor-Eucalyptus-Menthol (VICKS VAPORUB EX), Apply  topically., Disp: , Rfl:     digoxin (LANOXIN) 125 MCG tablet, Take 1 tablet by mouth Daily., Disp: 90 tablet, Rfl: 3    famotidine (PEPCID) 40 MG tablet, Take 1 tablet by mouth Daily., Disp: , Rfl:     furosemide (LASIX) 40 MG tablet, Take 1 tablet by mouth 2 (Two) Times a Day., Disp: , Rfl:     glimepiride (AMARYL) 2 MG tablet, Take 1 tablet by mouth Every Morning Before Breakfast., Disp: , Rfl:     levothyroxine (SYNTHROID, LEVOTHROID) 175 MCG tablet, Take 1 tablet by mouth Daily., Disp: , Rfl:     " metoprolol succinate XL (TOPROL-XL) 25 MG 24 hr tablet, Take 1 tablet by mouth Daily., Disp: 90 tablet, Rfl: 3    olmesartan (BENICAR) 40 MG tablet, Take 1 tablet by mouth Daily., Disp: 30 tablet, Rfl: 11    senna 8.6 MG tablet, Take 1 tablet by mouth Daily., Disp: , Rfl:     spironolactone (ALDACTONE) 25 MG tablet, Take 0.5 tablets by mouth Daily., Disp: 45 tablet, Rfl: 3    empagliflozin (Jardiance) 10 MG tablet tablet, Take 1 tablet by mouth Daily., Disp: 30 tablet, Rfl: 11    Review of Systems   Constitutional: Negative for chills and fever.   Cardiovascular:  Positive for orthopnea. Negative for chest pain and paroxysmal nocturnal dyspnea.   Respiratory:  Negative for cough and shortness of breath.    Skin:  Positive for poor wound healing. Negative for rash.   Gastrointestinal:  Negative for abdominal pain and heartburn.   Neurological:  Negative for dizziness and numbness.       Objective   /44 (BP Location: Left arm, Patient Position: Sitting, Cuff Size: Adult)   Pulse 76   Ht 160 cm (62.99\")   Wt 79.8 kg (176 lb)   SpO2 99%   BMI 31.18 kg/m²   Constitutional:       Appearance: Well-developed.   HENT:      Head: Normocephalic and atraumatic.   Pulmonary:      Effort: Pulmonary effort is normal.      Breath sounds: Normal breath sounds.   Cardiovascular:      Normal rate. Irregularly irregular rhythm.   Edema:     Peripheral edema absent.   Skin:     General: Skin is warm and dry.   Neurological:      Mental Status: Alert and oriented to person, place, and time.       Procedures      ICD-10-CM ICD-9-CM   1. Chronic combined systolic and diastolic CHF (congestive heart failure)  I50.42 428.42     428.0   2. Coronary artery disease involving coronary bypass graft of native heart without angina pectoris  I25.810 414.05   3. Longstanding persistent atrial fibrillation  I48.11 427.31   4. AICD (automatic cardioverter/defibrillator) present  Z95.810 V45.02   5. Essential hypertension  I10 401.9   6. " Type 2 diabetes mellitus with complication, with long-term current use of insulin  E11.8 250.90    Z79.4 V58.67         Assessment/Plan:  1.  Chronic combined systolic and diastolic CHF: EF 45-50% on echo from 2014.  Repeat echo ordered last visit but patient elected not to have this done.  Start Jardiance and provide samples.  Continue metoprolol succinate, olmesartan, spironolactone, and furosemide.       2.  Coronary artery disease: 2-vessel CABG in 2003.  She remains chest pain-free and reports she is not interested in any further testing or treatment.  Continue aspirin and metoprolol.  Statin can be considered in the future if patient is agreeable.     3.  Persistent atrial fibrillation: Anticoagulation previously stopped due to bleeding.  She is not interested in ADRIANA occlusion with Watchman device or retrial of anticoagulation and understands risks of thromboembolic events.  Continue aspirin.     4.  AICD in place: Device is at [facility] and she is not interested in considering generator change.      5.  Essential hypertension: Blood pressure remains well-controlled.    6.  Diabetes mellitus type 2: Jardiance started for CHF.  May need to decrease other diabetic medications if necessary.

## 2023-12-08 NOTE — LETTER
December 8, 2023     AL Carmichael  [address] [address]    Patient: Cary Shah   YOB: 1940   Date of Visit: 12/8/2023       Dear AL Carmichael    Cary Shah was in my office today. Below is a copy of my note.    If you have questions, please do not hesitate to call me. I look forward to following Cary along with you.         Sincerely,        Kan Fiore MD        CC: No Recipients      Reason for Visit: cardiovascular follow up.    HPI:  Cary Shah is a 83 y.o. female is here today for follow-up.  She was last in cardiology clinic on 9/11/2023 to reestablish cardiovascular care due to history of CHF.  Echo was repeated at last visit due to symptoms of orthopnea and last echo was done in 2014.  She elected not to do have this done.  Her defibrillator bothers her at times, typically when she is laying down at night.  She can feel the wires going up to her clavicle.  She has a foot wound that is slowly healing and is currently wearing a boot.  She was doing therapy at her nursing home and felt discomfort from using different muscles.  Her diabetes has been better controlled recently.      Previous Cardiac Testing and Procedures:  -Echo (11/14/2014) EF 45 to 50%, mild pulmonary hypertension, moderate LA enlargement, no significant valve dysfunction     Lab data:  -CBC (8/29/2023) WBC 7.9, hemoglobin 8.5, platelets 262  -Hemoglobin A1c (8/29/2023) 7.1%  -BMP (8/29/2023) creatinine 1.43, GFR 35, potassium 4.1, sodium 141    Patient Active Problem List   Diagnosis   • Chronic combined systolic and diastolic CHF (congestive heart failure)   • Coronary artery disease involving coronary bypass graft of native heart without angina pectoris   • Mixed hyperlipidemia   • Longstanding persistent atrial fibrillation   • S/P CABG x 2   • Status post coronary artery stent placement   • History of vaginal bleeding   • Type 2 diabetes mellitus with  complication, with long-term current use of insulin   • Class 1 obesity due to excess calories with serious comorbidity and body mass index (BMI) of 31.0 to 31.9 in adult   • Essential hypertension   • AICD (automatic cardioverter/defibrillator) present   • Stage 3b chronic kidney disease   • Cellulitis of lower extremity       Social History     Tobacco Use   • Smoking status: Some Days     Packs/day: .5     Types: Cigarettes     Start date: 1960   • Smokeless tobacco: Never   • Tobacco comments:     1 cigarette every now and then when gets a craving for one.  does not want to quit yet.   Vaping Use   • Vaping Use: Never used   Substance Use Topics   • Alcohol use: No   • Drug use: No       Family History   Problem Relation Age of Onset   • Coronary artery disease Mother    • Heart attack Mother    • Coronary artery disease Father    • Heart attack Father    • Heart disease Sister    • Heart attack Sister    • Coronary artery disease Sister    • Heart attack Brother    • Heart disease Brother    • Coronary artery disease Brother    • No Known Problems Maternal Grandmother    • No Known Problems Maternal Grandfather    • No Known Problems Paternal Grandmother    • No Known Problems Paternal Grandfather    • Heart disease Brother    • Heart disease Brother    • Heart attack Brother        The following portions of the patient's history were reviewed and updated as appropriate: allergies, current medications, past family history, past medical history, past social history, past surgical history, and problem list.      Current Outpatient Medications:   •  allopurinol (ZYLOPRIM) 100 MG tablet, Take 1 tablet by mouth Daily., Disp: , Rfl:   •  amLODIPine (NORVASC) 5 MG tablet, Take 1 tablet by mouth Daily., Disp: , Rfl:   •  aspirin  MG tablet, Take 1 tablet by mouth Daily., Disp: , Rfl:   •  calcitriol (ROCALTROL) 0.25 MCG capsule, TK ONE C PO  QD, Disp: , Rfl: 5  •  Camphor-Eucalyptus-Menthol (VICKS VAPORUB EX),  "Apply  topically., Disp: , Rfl:   •  digoxin (LANOXIN) 125 MCG tablet, Take 1 tablet by mouth Daily., Disp: 90 tablet, Rfl: 3  •  famotidine (PEPCID) 40 MG tablet, Take 1 tablet by mouth Daily., Disp: , Rfl:   •  furosemide (LASIX) 40 MG tablet, Take 1 tablet by mouth 2 (Two) Times a Day., Disp: , Rfl:   •  glimepiride (AMARYL) 2 MG tablet, Take 1 tablet by mouth Every Morning Before Breakfast., Disp: , Rfl:   •  levothyroxine (SYNTHROID, LEVOTHROID) 175 MCG tablet, Take 1 tablet by mouth Daily., Disp: , Rfl:   •  metoprolol succinate XL (TOPROL-XL) 25 MG 24 hr tablet, Take 1 tablet by mouth Daily., Disp: 90 tablet, Rfl: 3  •  olmesartan (BENICAR) 40 MG tablet, Take 1 tablet by mouth Daily., Disp: 30 tablet, Rfl: 11  •  senna 8.6 MG tablet, Take 1 tablet by mouth Daily., Disp: , Rfl:   •  spironolactone (ALDACTONE) 25 MG tablet, Take 0.5 tablets by mouth Daily., Disp: 45 tablet, Rfl: 3  •  empagliflozin (Jardiance) 10 MG tablet tablet, Take 1 tablet by mouth Daily., Disp: 30 tablet, Rfl: 11    Review of Systems   Constitutional: Negative for chills and fever.   Cardiovascular:  Positive for orthopnea. Negative for chest pain and paroxysmal nocturnal dyspnea.   Respiratory:  Negative for cough and shortness of breath.    Skin:  Positive for poor wound healing. Negative for rash.   Gastrointestinal:  Negative for abdominal pain and heartburn.   Neurological:  Negative for dizziness and numbness.       Objective  /44 (BP Location: Left arm, Patient Position: Sitting, Cuff Size: Adult)   Pulse 76   Ht 160 cm (62.99\")   Wt 79.8 kg (176 lb)   SpO2 99%   BMI 31.18 kg/m²   Constitutional:       Appearance: Well-developed.   HENT:      Head: Normocephalic and atraumatic.   Pulmonary:      Effort: Pulmonary effort is normal.      Breath sounds: Normal breath sounds.   Cardiovascular:      Normal rate. Irregularly irregular rhythm.   Edema:     Peripheral edema absent.   Skin:     General: Skin is warm and dry. "   Neurological:      Mental Status: Alert and oriented to person, place, and time.       Procedures      ICD-10-CM ICD-9-CM   1. Chronic combined systolic and diastolic CHF (congestive heart failure)  I50.42 428.42     428.0   2. Coronary artery disease involving coronary bypass graft of native heart without angina pectoris  I25.810 414.05   3. Longstanding persistent atrial fibrillation  I48.11 427.31   4. AICD (automatic cardioverter/defibrillator) present  Z95.810 V45.02   5. Essential hypertension  I10 401.9   6. Type 2 diabetes mellitus with complication, with long-term current use of insulin  E11.8 250.90    Z79.4 V58.67         Assessment/Plan:  1.  Chronic combined systolic and diastolic CHF: EF 45-50% on echo from 2014.  Repeat echo ordered last visit but patient elected not to have this done.  Start Jardiance and provide samples.  Continue metoprolol succinate, olmesartan, spironolactone, and furosemide.       2.  Coronary artery disease: 2-vessel CABG in 2003.  She remains chest pain-free and reports she is not interested in any further testing or treatment.  Continue aspirin and metoprolol.  Statin can be considered in the future if patient is agreeable.     3.  Persistent atrial fibrillation: Anticoagulation previously stopped due to bleeding.  She is not interested in ADRIANA occlusion with Watchman device or retrial of anticoagulation and understands risks of thromboembolic events.  Continue aspirin.     4.  AICD in place: Device is at [facility] and she is not interested in considering generator change.      5.  Essential hypertension: Blood pressure remains well-controlled.    6.  Diabetes mellitus type 2: Jardiance started for CHF.  May need to decrease other diabetic medications if necessary.

## 2024-04-23 ENCOUNTER — OFFICE VISIT (OUTPATIENT)
Dept: CARDIOLOGY | Facility: CLINIC | Age: 84
End: 2024-04-23
Payer: MEDICARE

## 2024-04-23 VITALS
WEIGHT: 190 LBS | OXYGEN SATURATION: 99 % | DIASTOLIC BLOOD PRESSURE: 56 MMHG | HEIGHT: 63 IN | SYSTOLIC BLOOD PRESSURE: 118 MMHG | HEART RATE: 53 BPM | BODY MASS INDEX: 33.66 KG/M2

## 2024-04-23 DIAGNOSIS — E78.2 MIXED HYPERLIPIDEMIA: ICD-10-CM

## 2024-04-23 DIAGNOSIS — Z95.810 AICD (AUTOMATIC CARDIOVERTER/DEFIBRILLATOR) PRESENT: ICD-10-CM

## 2024-04-23 DIAGNOSIS — Z79.4 TYPE 2 DIABETES MELLITUS WITH COMPLICATION, WITH LONG-TERM CURRENT USE OF INSULIN: ICD-10-CM

## 2024-04-23 DIAGNOSIS — E11.8 TYPE 2 DIABETES MELLITUS WITH COMPLICATION, WITH LONG-TERM CURRENT USE OF INSULIN: ICD-10-CM

## 2024-04-23 DIAGNOSIS — I50.42 CHRONIC COMBINED SYSTOLIC AND DIASTOLIC CHF (CONGESTIVE HEART FAILURE): Primary | ICD-10-CM

## 2024-04-23 DIAGNOSIS — I10 ESSENTIAL HYPERTENSION: ICD-10-CM

## 2024-04-23 DIAGNOSIS — E66.09 CLASS 1 OBESITY DUE TO EXCESS CALORIES WITH SERIOUS COMORBIDITY AND BODY MASS INDEX (BMI) OF 33.0 TO 33.9 IN ADULT: ICD-10-CM

## 2024-04-23 DIAGNOSIS — I25.810 CORONARY ARTERY DISEASE INVOLVING CORONARY BYPASS GRAFT OF NATIVE HEART WITHOUT ANGINA PECTORIS: ICD-10-CM

## 2024-04-23 DIAGNOSIS — I48.11 LONGSTANDING PERSISTENT ATRIAL FIBRILLATION: ICD-10-CM

## 2024-04-23 NOTE — PROGRESS NOTES
Subjective:     Encounter Date: 04/23/2024      Patient ID: Cary Shah is a 83 y.o. female with chronic combined systolic and diastolic CHF, Coronary artery disease, persistent atrial fibrillation, AICD in place, hypertension and Type 2 DM.    Chief Complaint: routine follow up  History of Present Illness  Patient presents today for management of chronic congestive heart failure. Today she reports that she has been doing ok. She is not wanting to have anymore testing or procedures if she is able to avoid it. She was started on Jardiance but stopped it. She denies any chest pain. She reports that she has had some pain with her defibrillator from time to time. She reports that she has more congestion and has been more short of breath. She reports that she has coughed up a lot of phlem lately. She reports that she has had leg swelling that is worse in her L>R. She has a sore on her left foot. She reports some heart racing or palpitations. She report some rare dizziness. She denies any orthopnea or PND. BP has remained controlled at other visit. Patient follows with Rufina MELENDEZ as PCP.     Previous Cardiac Testing and Procedures:  -Echo (11/14/2014) EF 45 to 50%, mild pulmonary hypertension, moderate LA enlargement, no significant valve dysfunction  -CBC (8/29/2023) WBC 7.9, hemoglobin 8.5, platelets 262  -Hemoglobin A1c (8/29/2023) 7.1%  -BMP (8/29/2023) creatinine 1.43, GFR 35, potassium 4.1, sodium 141    The following portions of the patient's history were reviewed and updated as appropriate: allergies, current medications, past family history, past medical history, past social history, past surgical history and problem list.    Allergies   Allergen Reactions    Sulfa Antibiotics Palpitations    Bactrim [Sulfamethoxazole-Trimethoprim] Palpitations    Clindamycin/Lincomycin Rash    Levaquin [Levofloxacin] Palpitations       Current Outpatient Medications:     allopurinol (ZYLOPRIM) 100 MG  tablet, Take 1 tablet by mouth Daily., Disp: , Rfl:     amLODIPine (NORVASC) 5 MG tablet, Take 1 tablet by mouth Daily., Disp: , Rfl:     aspirin  MG tablet, Take 1 tablet by mouth Daily., Disp: , Rfl:     calcitriol (ROCALTROL) 0.25 MCG capsule, TK ONE C PO  QD, Disp: , Rfl: 5    Camphor-Eucalyptus-Menthol (VICKS VAPORUB EX), Apply  topically., Disp: , Rfl:     digoxin (LANOXIN) 125 MCG tablet, Take 1 tablet by mouth Daily., Disp: 90 tablet, Rfl: 3    famotidine (PEPCID) 40 MG tablet, Take 1 tablet by mouth Daily., Disp: , Rfl:     furosemide (LASIX) 40 MG tablet, Take 1 tablet by mouth Daily., Disp: , Rfl:     glimepiride (AMARYL) 2 MG tablet, Take 1 tablet by mouth Every Morning Before Breakfast., Disp: , Rfl:     levothyroxine (SYNTHROID, LEVOTHROID) 200 MCG tablet, Take 1 tablet by mouth Daily., Disp: , Rfl:     metoprolol succinate XL (TOPROL-XL) 25 MG 24 hr tablet, Take 1 tablet by mouth Daily., Disp: 90 tablet, Rfl: 3    olmesartan (BENICAR) 40 MG tablet, Take 1 tablet by mouth Daily., Disp: 30 tablet, Rfl: 11    spironolactone (ALDACTONE) 25 MG tablet, Take 0.5 tablets by mouth Daily., Disp: 45 tablet, Rfl: 3      Past Medical History:   Diagnosis Date    AICD present, double chamber     Arteriosclerosis of coronary artery bypass graft     stents and CABG    Atherosclerosis of native coronary artery with angina pectoris     Back pain, chronic     Cardiac device in situ     Cardiac device in situ (OPTIVOL REMOTE)    Cardiac device in situ     Cardiac device in situ, other    Chest pain     Chronic atrial fibrillation     Chronic kidney disease due to diabetes mellitus     Chronic systolic heart failure     EF 45-50% 11/2014 echo    Diabetes mellitus due to underlying condition with complications     Diabetes mellitus type 2, uncontrolled, without complications     Gout     H/O acute myocardial infarction     History of coronary artery bypass graft     Coronary Artery Bypass, Two    Hyperlipidemia      Hypothyroidism     Ischemic cardiomyopathy     Malignant hypertension     better control    Myocardial infarction     Non-compliance     Shortness of breath     Shoulder pain, left     Sleep apnea     not officially diagnosed    Snoring     Ventricular fibrillation      Social History     Socioeconomic History    Marital status:    Tobacco Use    Smoking status: Some Days     Current packs/day: 0.50     Average packs/day: 0.5 packs/day for 64.3 years (32.2 ttl pk-yrs)     Types: Cigarettes     Start date: 1960    Smokeless tobacco: Never    Tobacco comments:     1 cigarette every now and then when gets a craving for one.  does not want to quit yet.   Vaping Use    Vaping status: Never Used   Substance and Sexual Activity    Alcohol use: No    Drug use: No    Sexual activity: Defer       Review of Systems   Constitutional: Negative for malaise/fatigue.   Cardiovascular:  Positive for dyspnea on exertion and leg swelling. Negative for chest pain, irregular heartbeat, near-syncope, orthopnea, palpitations, paroxysmal nocturnal dyspnea and syncope.   Respiratory:  Negative for shortness of breath.    Hematologic/Lymphatic: Bruises/bleeds easily.   Skin:  Positive for poor wound healing.   Genitourinary:  Negative for hematuria.   Neurological:  Negative for dizziness and weakness.   All other systems reviewed and are negative.         Objective:     Vitals reviewed.   Constitutional:       General: Not in acute distress.     Appearance: Normal appearance. Well-developed.   Eyes:      Pupils: Pupils are equal, round, and reactive to light.   HENT:      Head: Normocephalic and atraumatic.      Nose: Nose normal.   Neck:      Vascular: No carotid bruit.   Pulmonary:      Effort: Pulmonary effort is normal. No respiratory distress.      Breath sounds: Normal breath sounds. No wheezing. No rales.   Cardiovascular:      Normal rate. Irregularly irregular rhythm.      Murmurs: There is no murmur.   Edema:      "Peripheral edema present.     Pretibial: 2+ edema of the right pretibial area.     Ankle: 2+ edema of the right ankle.     Comments: Leg foot in walking boot.   Abdominal:      General: There is no distension.      Palpations: Abdomen is soft.   Musculoskeletal: Normal range of motion.      Cervical back: Normal range of motion and neck supple. Skin:     General: Skin is warm.      Findings: No erythema or rash.   Neurological:      Mental Status: Alert and oriented to person, place, and time.   Psychiatric:         Speech: Speech normal.         Behavior: Behavior normal.         Thought Content: Thought content normal.         Judgment: Judgment normal.         /56   Pulse 53   Ht 160 cm (63\")   Wt 86.2 kg (190 lb)   SpO2 99%   BMI 33.66 kg/m²     Procedures    Lab Review:       Lab Results   Component Value Date    CHLPL 223 (H) 05/20/2015    TRIG 398 (H) 05/20/2015    HDL 32 05/20/2015     (H) 05/20/2015     Lab Results   Component Value Date    HGBA1C 7.1 (H) 08/29/2023         I have personally reviewed labs, past device interrogation, and past office notes prior to patients visit.   Assessment:          Diagnosis Plan   1. Chronic combined systolic and diastolic CHF (congestive heart failure)        2. Coronary artery disease involving coronary bypass graft of native heart without angina pectoris        3. Longstanding persistent atrial fibrillation        4. AICD (automatic cardioverter/defibrillator) present        5. Essential hypertension        6. Type 2 diabetes mellitus with complication, with long-term current use of insulin        7. Mixed hyperlipidemia        8. Class 1 obesity due to excess calories with serious comorbidity and body mass index (BMI) of 33.0 to 33.9 in adult               Plan:       Chronic combined congestive heart failure: LVEF 45-50% on echo from 2014. Repeat echo was ordered but patient decided to not have it done. NYHA class II-III. Patient is stable and " euvolemic in office. Continue metoprolol, olmesartan, spironolactone and lasix    2. Coronary artery disease: s/p 2 V CABG in 2003. Patient remains chest pain free. Continue aspirin and metoprolol.     3. Persistent atrial fibrillation: Anticoagulation was previously stopped due to bleeding. Patient is not interested in ADRIANA occlusion with Watchman device or retrial of anticoagulation. Patient verbalized the risks of thromboembolic events. Will continue aspirin at this time    4. AICD in place: Device reached [facility] and she is not interested in considering a generator change.     5. Hypertension: Controlled. Continue current medications    6. Type 2 DM: managed and followed by PCP    7. Hyperlipidemia: managed and followed by PCP.     8. Obesity: BMI is >= 30 and <35. (Class 1 Obesity). The following options were offered after discussion;: exercise counseling/recommendations and nutrition counseling/recommendations    I spent 35 minutes caring for Cary on this date of service. This time includes time spent by me in the following activities:preparing for the visit, reviewing tests, obtaining and/or reviewing a separately obtained history, performing a medically appropriate examination and/or evaluation , counseling and educating the patient/family/caregiver, and documenting information in the medical record     Patient is to follow up in 4 months or sooner if needed

## 2024-09-09 RX ORDER — DIGOXIN 125 MCG
125 TABLET ORAL DAILY
Qty: 90 TABLET | Refills: 3 | Status: SHIPPED | OUTPATIENT
Start: 2024-09-09

## 2024-09-09 RX ORDER — SPIRONOLACTONE 25 MG/1
12.5 TABLET ORAL DAILY
Qty: 45 TABLET | Refills: 3 | Status: SHIPPED | OUTPATIENT
Start: 2024-09-09

## 2024-09-12 RX ORDER — OLMESARTAN MEDOXOMIL 40 MG/1
40 TABLET ORAL DAILY
Qty: 30 TABLET | Refills: 11 | Status: SHIPPED | OUTPATIENT
Start: 2024-09-12

## 2024-10-16 ENCOUNTER — OFFICE VISIT (OUTPATIENT)
Dept: CARDIOLOGY | Facility: CLINIC | Age: 84
End: 2024-10-16
Payer: MEDICARE

## 2024-10-16 ENCOUNTER — CLINICAL SUPPORT NO REQUIREMENTS (OUTPATIENT)
Dept: CARDIOLOGY | Facility: CLINIC | Age: 84
End: 2024-10-16
Payer: MEDICARE

## 2024-10-16 VITALS
HEART RATE: 92 BPM | DIASTOLIC BLOOD PRESSURE: 46 MMHG | WEIGHT: 188 LBS | HEIGHT: 63 IN | SYSTOLIC BLOOD PRESSURE: 128 MMHG | OXYGEN SATURATION: 100 % | BODY MASS INDEX: 33.31 KG/M2

## 2024-10-16 DIAGNOSIS — Z95.810 AICD (AUTOMATIC CARDIOVERTER/DEFIBRILLATOR) PRESENT: Primary | ICD-10-CM

## 2024-10-16 DIAGNOSIS — Z95.810 AICD (AUTOMATIC CARDIOVERTER/DEFIBRILLATOR) PRESENT: ICD-10-CM

## 2024-10-16 DIAGNOSIS — E78.2 MIXED HYPERLIPIDEMIA: ICD-10-CM

## 2024-10-16 DIAGNOSIS — I25.810 CORONARY ARTERY DISEASE INVOLVING CORONARY BYPASS GRAFT OF NATIVE HEART WITHOUT ANGINA PECTORIS: ICD-10-CM

## 2024-10-16 DIAGNOSIS — I10 ESSENTIAL HYPERTENSION: ICD-10-CM

## 2024-10-16 DIAGNOSIS — E66.09 CLASS 1 OBESITY DUE TO EXCESS CALORIES WITH SERIOUS COMORBIDITY AND BODY MASS INDEX (BMI) OF 33.0 TO 33.9 IN ADULT: ICD-10-CM

## 2024-10-16 DIAGNOSIS — I48.11 LONGSTANDING PERSISTENT ATRIAL FIBRILLATION: ICD-10-CM

## 2024-10-16 DIAGNOSIS — E66.811 CLASS 1 OBESITY DUE TO EXCESS CALORIES WITH SERIOUS COMORBIDITY AND BODY MASS INDEX (BMI) OF 33.0 TO 33.9 IN ADULT: ICD-10-CM

## 2024-10-16 DIAGNOSIS — Z79.4 TYPE 2 DIABETES MELLITUS WITH COMPLICATION, WITH LONG-TERM CURRENT USE OF INSULIN: ICD-10-CM

## 2024-10-16 DIAGNOSIS — I50.22 CHRONIC SYSTOLIC HEART FAILURE: ICD-10-CM

## 2024-10-16 DIAGNOSIS — I50.42 CHRONIC COMBINED SYSTOLIC AND DIASTOLIC CHF (CONGESTIVE HEART FAILURE): Primary | ICD-10-CM

## 2024-10-16 DIAGNOSIS — E11.8 TYPE 2 DIABETES MELLITUS WITH COMPLICATION, WITH LONG-TERM CURRENT USE OF INSULIN: ICD-10-CM

## 2024-10-16 NOTE — PROGRESS NOTES
Subjective:     Encounter Date: 10/16/2024      Patient ID: Cary Shah is a 84 y.o. female with chronic combined systolic and diastolic CHF, Coronary artery disease, persistent atrial fibrillation, AICD in place, hypertension and Type 2 DM.    Chief Complaint: routine follow up  History of Present Illness  Patient presents today for management of chronic congestive heart failure. Today she reports that she has been doing ok. She walked into the office today and had some dyspnea on exertion. She reports that this dyspnea on exertion has been worse over the past month or two. She denies any chest pain. She reports that she has had difficulty with her hydration status. She reports chronic swelling. She reports that if she is up, moving around much the swelling is worse. She reports that she has had some pain with her defibrillator from time to time. She is wondering if the battery is all the way dead yet. She reports that she still is not wanting the battery changed. She is not wanting to have anymore testing or procedures if she is able to avoid it. She was started on Jardiance but stopped it. She has a chronic sore on her left foot. She reports some heart racing or palpitations. She report some rare dizziness. She denies any orthopnea or PND. BP has remained controlled at other visit. Patient follows with Rufina MELENDEZ as PCP.     Previous Cardiac Testing and Procedures:  -Echo (11/14/2014) EF 45 to 50%, mild pulmonary hypertension, moderate LA enlargement, no significant valve dysfunction  -CBC (8/29/2023) WBC 7.9, hemoglobin 8.5, platelets 262  -Hemoglobin A1c (8/29/2023) 7.1%  -BMP (8/29/2023) creatinine 1.43, GFR 35, potassium 4.1, sodium 141    The following portions of the patient's history were reviewed and updated as appropriate: allergies, current medications, past family history, past medical history, past social history, past surgical history and problem list.    Allergies   Allergen  Reactions    Sulfa Antibiotics Palpitations    Bactrim [Sulfamethoxazole-Trimethoprim] Palpitations    Clindamycin/Lincomycin Rash    Levaquin [Levofloxacin] Palpitations       Current Outpatient Medications:     allopurinol (ZYLOPRIM) 100 MG tablet, Take 1 tablet by mouth Daily., Disp: , Rfl:     amLODIPine (NORVASC) 5 MG tablet, Take 1 tablet by mouth Daily., Disp: , Rfl:     aspirin  MG tablet, Take 1 tablet by mouth Daily., Disp: , Rfl:     calcitriol (ROCALTROL) 0.25 MCG capsule, TK ONE C PO  QD, Disp: , Rfl: 5    Camphor-Eucalyptus-Menthol (VICKS VAPORUB EX), Apply  topically., Disp: , Rfl:     digoxin (LANOXIN) 125 MCG tablet, TAKE 1 TABLET BY MOUTH DAILY, Disp: 90 tablet, Rfl: 3    famotidine (PEPCID) 40 MG tablet, Take 1 tablet by mouth Daily., Disp: , Rfl:     furosemide (LASIX) 40 MG tablet, Take 1 tablet by mouth Daily., Disp: , Rfl:     glimepiride (AMARYL) 2 MG tablet, Take 1 tablet by mouth Every Morning Before Breakfast., Disp: , Rfl:     levothyroxine (SYNTHROID, LEVOTHROID) 200 MCG tablet, Take 1 tablet by mouth Daily., Disp: , Rfl:     metoprolol succinate XL (TOPROL-XL) 25 MG 24 hr tablet, Take 1 tablet by mouth Daily., Disp: 90 tablet, Rfl: 3    olmesartan (BENICAR) 40 MG tablet, TAKE 1 TABLET BY MOUTH DAILY, Disp: 30 tablet, Rfl: 11    spironolactone (ALDACTONE) 25 MG tablet, TAKE 1/2 TABLET BY MOUTH DAILY, Disp: 45 tablet, Rfl: 3      Past Medical History:   Diagnosis Date    AICD present, double chamber     Arteriosclerosis of coronary artery bypass graft     stents and CABG    Atherosclerosis of native coronary artery with angina pectoris     Back pain, chronic     Cardiac device in situ     Cardiac device in situ (OPTIVOL REMOTE)    Cardiac device in situ     Cardiac device in situ, other    Chest pain     Chronic atrial fibrillation     Chronic kidney disease due to diabetes mellitus     Chronic systolic heart failure     EF 45-50% 11/2014 echo    Diabetes mellitus due to underlying  condition with complications     Diabetes mellitus type 2, uncontrolled, without complications     Gout     H/O acute myocardial infarction     History of coronary artery bypass graft     Coronary Artery Bypass, Two    Hyperlipidemia     Hypothyroidism     Ischemic cardiomyopathy     Malignant hypertension     better control    Myocardial infarction     Non-compliance     Shortness of breath     Shoulder pain, left     Sleep apnea     not officially diagnosed    Snoring     Ventricular fibrillation      Social History     Socioeconomic History    Marital status:    Tobacco Use    Smoking status: Some Days     Current packs/day: 0.50     Average packs/day: 0.5 packs/day for 64.8 years (32.4 ttl pk-yrs)     Types: Cigarettes     Start date: 1960    Smokeless tobacco: Never    Tobacco comments:     1 cigarette every now and then when gets a craving for one.  does not want to quit yet.   Vaping Use    Vaping status: Never Used   Substance and Sexual Activity    Alcohol use: No    Drug use: No    Sexual activity: Defer       Review of Systems   Constitutional: Negative for malaise/fatigue.   Cardiovascular:  Positive for dyspnea on exertion and leg swelling. Negative for chest pain, irregular heartbeat, near-syncope, orthopnea, palpitations, paroxysmal nocturnal dyspnea and syncope.   Respiratory:  Positive for shortness of breath.    Hematologic/Lymphatic: Bruises/bleeds easily.   Skin:  Positive for poor wound healing.   Genitourinary:  Negative for hematuria.   Neurological:  Negative for dizziness and weakness.   All other systems reviewed and are negative.         Objective:     Vitals reviewed.   Constitutional:       General: Not in acute distress.     Appearance: Normal appearance. Well-developed.   Eyes:      Pupils: Pupils are equal, round, and reactive to light.   HENT:      Head: Normocephalic and atraumatic.      Nose: Nose normal.   Neck:      Vascular: No carotid bruit.   Pulmonary:      Effort:  "Pulmonary effort is normal. No respiratory distress.      Breath sounds: Normal breath sounds. No wheezing. No rales.   Cardiovascular:      Normal rate. Irregularly irregular rhythm.      Murmurs: There is no murmur.   Edema:     Peripheral edema present.     Pretibial: 2+ edema of the right pretibial area.     Ankle: 2+ edema of the right ankle.  Abdominal:      General: There is no distension.      Palpations: Abdomen is soft.   Musculoskeletal: Normal range of motion.      Cervical back: Normal range of motion and neck supple. Skin:     General: Skin is warm.      Findings: No erythema or rash.   Neurological:      Mental Status: Alert and oriented to person, place, and time.   Psychiatric:         Speech: Speech normal.         Behavior: Behavior normal.         Thought Content: Thought content normal.         Judgment: Judgment normal.         /46   Pulse 92   Ht 160 cm (63\")   Wt 85.3 kg (188 lb)   SpO2 100%   BMI 33.30 kg/m²       ECG 12 Lead    Date/Time: 10/16/2024 12:57 PM  Performed by: Denver Mendez APRN    Authorized by: Denver Mendez APRN  Comparison: compared with previous ECG from 9/11/2023  Similar to previous ECG  Rhythm: atrial fibrillation  Ectopy: infrequent PVCs  Rate: normal  BPM: 92          Lab Review:       Lab Results   Component Value Date    CHLPL 223 (H) 05/20/2015    TRIG 398 (H) 05/20/2015    HDL 32 05/20/2015     (H) 05/20/2015     Lab Results   Component Value Date    HGBA1C 7.1 (H) 08/29/2023         I have personally reviewed labs, past device interrogation, and past office notes prior to patients visit.   Assessment:          Diagnosis Plan   1. Chronic combined systolic and diastolic CHF (congestive heart failure)        2. Coronary artery disease involving coronary bypass graft of native heart without angina pectoris        3. Longstanding persistent atrial fibrillation        4. AICD (automatic cardioverter/defibrillator) present        5. Essential " hypertension        6. Type 2 diabetes mellitus with complication, with long-term current use of insulin        7. Mixed hyperlipidemia        8. Class 1 obesity due to excess calories with serious comorbidity and body mass index (BMI) of 33.0 to 33.9 in adult                 Plan:       Chronic combined congestive heart failure: LVEF 45-50% on echo from 2014..NYHA class II-III. Discussed worsening dyspnea on exertion, offered further evaluation with labs and echo but patient declined. Patient has some leg swelling today in office. Discussed using additional lasix as needed for worsening leg swelling or weight gain. Continue metoprolol, olmesartan, spironolactone and lasix    2. Coronary artery disease: s/p 2 V CABG in 2003. Patient remains chest pain free. Continue aspirin and metoprolol.     3. Persistent atrial fibrillation: Anticoagulation was previously stopped due to bleeding. Patient is not interested in ADRIANA occlusion with Watchman device or retrial of anticoagulation. Patient verbalized the risks of thromboembolic events. Continue aspirin. EKG shows heart rate at 92 today in office. Patient is on metoprolol and digoxin; will continue for now and monitor heart rate.    4. AICD in place: Device reached CASSY and she is not interested in considering a generator change. Did have interrogation today in office; device still has some intermittent capturing and appears to be pacing about 50% of the time. Will decrease setting to VVI 50 and reassess at follow up. Patient is to notify office of feeling any worse.     5. Hypertension: Controlled. Continue current medications    6. Type 2 DM: managed and followed by PCP    7. Hyperlipidemia: managed and followed by PCP.     8. Obesity: BMI is >= 30 and <35. (Class 1 Obesity). The following options were offered after discussion;: exercise counseling/recommendations and nutrition counseling/recommendations    I attest that all portions of this note reviewed and all  information has been updated by myself to reflect the patient's current status.      I spent 37 minutes caring for Cary on this date of service. This time includes time spent by me in the following activities:preparing for the visit, reviewing tests, obtaining and/or reviewing a separately obtained history, performing a medically appropriate examination and/or evaluation , counseling and educating the patient/family/caregiver, and documenting information in the medical record    I spent 2 minutes on the separately reported service of EKG. This time is not included in the time used to support the E/M service also reported today.    Patient is to follow up in  4 months or sooner if needed

## 2024-10-16 NOTE — Clinical Note
Patient wanted device checked to see if it was still functioning - Has been RRT since 9/2022 - Now EOS - intermittently pacing but also showing frequent loss of capture.  Discussed with rep and reprogrammed to VVI 50 bpm (was VVI 60 bpm).  Will program VVI 40 bpm if still functioning in January.  Please review and sign.

## 2024-10-16 NOTE — PROGRESS NOTES
Dual Chamber AICD Interrogation Report  IN OFFICE    October 16, 2024    Primary Cardiologist: Macarena  : Medtronic Model: Evera XT  [id number]  Implant date: 5.28.2015    Reason for evaluation:  Provider Requested to see if device is still functioning.  Indication for AICD: Chronic Systolic Heart Failure    Interrogation performed by:  Maday Chaidez RN    Measurements  Atrial sensing - P wave: 0.1 mV  Atrial threshold: N/P - AF  Atrial lead impedance: 437 ohms  Ventricular sensing - R wave: 3.9 mV  Ventricular threshold: 2.25 V @ 0.4 ms (intermittent, frequent LOC noted)  Ventricular lead impedance:  722 ohms  Shock impedance:  RV- 42 ohms   SVC- 67 ohms    Manual sensing and threshold testing performed:  Yes      Diagnostic Data  Atrial paced: 0 %  Ventricular paced: 52.6 %    Episodes/Alerts: EOS: REPLACE DEVICE IMMEDIATELY.  VF detection and all VF therapies are OFF.  AT/AF > 6 hours daily for 402 days.  Possible Optivol fluid accumulation 9.22.24 - Present.      Note:  RN inhibited pacing and lowest HR was 48 bpm.  Patient declines generator change.    Battery status:  End of Service (EOS) , RRT reached 9-      Final Parameters  Mode: VVI  Lower rate: 50 bpm   Ventricular - Amplitude: 3.25 V Pulse width: 0.4 ms Sensitivity: 0.3 mV     Changes made: LRL decreased to 50 bpm, with plan to decrease to 40 bpm in January 2024.    Conclusions: Device battery at EOS, Intermittent RV loss of capture noted    Remote Monitor:  No.    Follow up: 3 months in office to assess device function.

## 2025-01-20 ENCOUNTER — OFFICE VISIT (OUTPATIENT)
Dept: CARDIOLOGY | Facility: CLINIC | Age: 85
End: 2025-01-20
Payer: MEDICARE

## 2025-01-20 VITALS
HEIGHT: 63 IN | DIASTOLIC BLOOD PRESSURE: 48 MMHG | HEART RATE: 72 BPM | WEIGHT: 185 LBS | OXYGEN SATURATION: 98 % | SYSTOLIC BLOOD PRESSURE: 124 MMHG | BODY MASS INDEX: 32.78 KG/M2

## 2025-01-20 DIAGNOSIS — Z95.810 AICD (AUTOMATIC CARDIOVERTER/DEFIBRILLATOR) PRESENT: ICD-10-CM

## 2025-01-20 DIAGNOSIS — I10 ESSENTIAL HYPERTENSION: ICD-10-CM

## 2025-01-20 DIAGNOSIS — I25.810 CORONARY ARTERY DISEASE INVOLVING CORONARY BYPASS GRAFT OF NATIVE HEART WITHOUT ANGINA PECTORIS: ICD-10-CM

## 2025-01-20 DIAGNOSIS — I48.11 LONGSTANDING PERSISTENT ATRIAL FIBRILLATION: ICD-10-CM

## 2025-01-20 DIAGNOSIS — I50.42 CHRONIC COMBINED SYSTOLIC AND DIASTOLIC CHF (CONGESTIVE HEART FAILURE): Primary | ICD-10-CM

## 2025-01-20 PROCEDURE — 1160F RVW MEDS BY RX/DR IN RCRD: CPT | Performed by: INTERNAL MEDICINE

## 2025-01-20 PROCEDURE — 3074F SYST BP LT 130 MM HG: CPT | Performed by: INTERNAL MEDICINE

## 2025-01-20 PROCEDURE — 99214 OFFICE O/P EST MOD 30 MIN: CPT | Performed by: INTERNAL MEDICINE

## 2025-01-20 PROCEDURE — 1159F MED LIST DOCD IN RCRD: CPT | Performed by: INTERNAL MEDICINE

## 2025-01-20 PROCEDURE — 3078F DIAST BP <80 MM HG: CPT | Performed by: INTERNAL MEDICINE

## 2025-01-20 PROCEDURE — 93000 ELECTROCARDIOGRAM COMPLETE: CPT | Performed by: INTERNAL MEDICINE

## 2025-01-20 NOTE — PROGRESS NOTES
Reason for Visit: cardiovascular follow up.    HPI:  Cary Shah is a 84 y.o. female is here today for follow-up.  She has a wound on her foot that has been there since August and is not healing well.  She has a lot of edema in her legs as well.  She notes her toes are discolored.  She continues to get out of breath relatively easily.  She has been told she needs to get the fluid drained off of her legs.  She was instructed to take increased Lasix dose but cannot tolerate any more than 40 mg a day.  She was on hospice at 1 point but got off to do her foot surgery and is not plan to go back on.  She did not feel like it provided her with much benefit.  She has declined additional cardiac testing in the past and reports she continues to feel this way.      Previous Cardiac Testing and Procedures:  -Echo (11/14/2014) EF 45-50%, mild pulmonary hypertension, moderate LA enlargement, no significant valve dysfunction     Lab data:  -CBC (8/29/2023) WBC 7.9, hemoglobin 8.5, platelets 262  -Hemoglobin A1c (8/29/2023) 7.1%  -BMP (8/29/2023) creatinine 1.43, GFR 35, potassium 4.1, sodium 141  -Lipid panel (12/13/2024) HDL 31, triglycerides 85  -BMP (12/13/2024) creatinine 2.15, GFR 22, potassium 5.4, sodium 139    Patient Active Problem List   Diagnosis    Chronic combined systolic and diastolic CHF (congestive heart failure)    Coronary artery disease involving coronary bypass graft of native heart without angina pectoris    Mixed hyperlipidemia    Longstanding persistent atrial fibrillation    S/P CABG x 2    Status post coronary artery stent placement    History of vaginal bleeding    Type 2 diabetes mellitus with complication, with long-term current use of insulin    Class 1 obesity due to excess calories with serious comorbidity and body mass index (BMI) of 33.0 to 33.9 in adult    Essential hypertension    AICD (automatic cardioverter/defibrillator) present    CKD (chronic kidney disease) stage 4, GFR 15-29  ml/min    Cellulitis of lower extremity       Social History     Tobacco Use    Smoking status: Some Days     Current packs/day: 0.50     Average packs/day: 0.5 packs/day for 65.1 years (32.5 ttl pk-yrs)     Types: Cigarettes     Start date: 1960     Passive exposure: Never    Smokeless tobacco: Never    Tobacco comments:     1 cigarette every now and then when gets a craving for one.  does not want to quit yet.   Vaping Use    Vaping status: Never Used   Substance Use Topics    Alcohol use: No    Drug use: No       Family History   Problem Relation Age of Onset    Coronary artery disease Mother     Heart attack Mother     Coronary artery disease Father     Heart attack Father     Heart disease Sister     Heart attack Sister     Coronary artery disease Sister     Heart attack Brother     Heart disease Brother     Coronary artery disease Brother     No Known Problems Maternal Grandmother     No Known Problems Maternal Grandfather     No Known Problems Paternal Grandmother     No Known Problems Paternal Grandfather     Heart disease Brother     Heart disease Brother     Heart attack Brother        The following portions of the patient's history were reviewed and updated as appropriate: allergies, current medications, past family history, past medical history, past social history, past surgical history, and problem list.      Current Outpatient Medications:     allopurinol (ZYLOPRIM) 100 MG tablet, Take 1 tablet by mouth Daily., Disp: , Rfl:     amLODIPine (NORVASC) 5 MG tablet, Take 1 tablet by mouth Daily., Disp: , Rfl:     aspirin  MG tablet, Take 1 tablet by mouth Daily., Disp: , Rfl:     calcitriol (ROCALTROL) 0.25 MCG capsule, TK ONE C PO  QD, Disp: , Rfl: 5    digoxin (LANOXIN) 125 MCG tablet, TAKE 1 TABLET BY MOUTH DAILY, Disp: 90 tablet, Rfl: 3    famotidine (PEPCID) 40 MG tablet, Take 1 tablet by mouth Daily., Disp: , Rfl:     furosemide (LASIX) 40 MG tablet, Take 1 tablet by mouth Daily., Disp: , Rfl:  "    glimepiride (AMARYL) 2 MG tablet, Take 1 tablet by mouth Every Morning Before Breakfast., Disp: , Rfl:     levothyroxine (SYNTHROID, LEVOTHROID) 200 MCG tablet, Take 1 tablet by mouth Daily., Disp: , Rfl:     metoprolol succinate XL (TOPROL-XL) 25 MG 24 hr tablet, Take 1 tablet by mouth Daily., Disp: 90 tablet, Rfl: 3    olmesartan (BENICAR) 40 MG tablet, TAKE 1 TABLET BY MOUTH DAILY, Disp: 30 tablet, Rfl: 11    spironolactone (ALDACTONE) 25 MG tablet, TAKE 1/2 TABLET BY MOUTH DAILY, Disp: 45 tablet, Rfl: 3    Camphor-Eucalyptus-Menthol (VICKS VAPORUB EX), Apply  topically. (Patient not taking: Reported on 1/20/2025), Disp: , Rfl:     Review of Systems   Constitutional: Negative for chills and fever.   Cardiovascular:  Positive for dyspnea on exertion and leg swelling. Negative for chest pain and paroxysmal nocturnal dyspnea.   Respiratory:  Positive for shortness of breath. Negative for cough.    Skin:  Positive for poor wound healing. Negative for rash.   Gastrointestinal:  Negative for abdominal pain and heartburn.   Neurological:  Positive for weakness. Negative for dizziness and numbness.       Objective   /48   Pulse 72   Ht 160 cm (63\")   Wt 83.9 kg (185 lb)   SpO2 98%   BMI 32.77 kg/m²   Constitutional:       Appearance: Well-developed. Frail. Chronically ill-appearing.   HENT:      Head: Normocephalic and atraumatic.   Pulmonary:      Effort: Pulmonary effort is normal.      Breath sounds: Normal breath sounds.   Cardiovascular:      Normal rate. Irregular rhythm.   Edema:     Peripheral edema present.  Skin:     General: Skin is warm and dry.   Neurological:      Mental Status: Alert and oriented to person, place, and time.         ECG 12 Lead    Date/Time: 1/20/2025 4:19 PM  Performed by: Kan Fiore MD    Authorized by: Kan Fiore MD  Comparison: compared with previous ECG from 10/16/2024  Comparison to previous ECG: Ventricular pacing is now present  Rhythm: atrial " fibrillation  Ectopy: unifocal PVCs  Rate: normal  Pacing: ventricular pacing          ICD-10-CM ICD-9-CM   1. Chronic combined systolic and diastolic CHF (congestive heart failure)  I50.42 428.42     428.0   2. Coronary artery disease involving coronary bypass graft of native heart without angina pectoris  I25.810 414.05   3. Longstanding persistent atrial fibrillation  I48.11 427.31   4. AICD (automatic cardioverter/defibrillator) present  Z95.810 V45.02   5. Essential hypertension  I10 401.9         Assessment/Plan:  1.  Chronic combined systolic and diastolic CHF: EF 45-50% on last echo in 2014.  Patient declines any further testing or procedures.  Came off hospice to have surgery, but does not want to get any further testing or procedures done.  Jardiance previously ordered but is no longer taking.  Continue metoprolol succinate, olmesartan, spironolactone, and furosemide as tolerated.       2.  Coronary artery disease: 2-vessel CABG in 2003.  No chest pain symptoms and declines any further testing.  Continue aspirin and metoprolol.      3.  Persistent atrial fibrillation: Anticoagulation stopped in the past secondary to bleeding.  Previously declined ADRIANA occlusion and is not willing to consider additional anticoagulation.  Continue aspirin.    4.  AICD in place: Device is nearing EOS.  She is not willing to have a generator change or any other procedures.  She understands the risks involved.     5.  Essential hypertension: Systolic blood pressure is normal today with low diastolic pressure.

## 2025-01-20 NOTE — LETTER
January 20, 2025     AL Carmichael  [address] [facility] [address]    Patient: Cary Shah   YOB: 1940   Date of Visit: 1/20/2025       Dear AL Carmichael    Cary Shah was in my office today. Below is a copy of my note.    If you have questions, please do not hesitate to call me. I look forward to following Cary along with you.         Sincerely,        Kan Fiore MD        CC: No Recipients      Reason for Visit: cardiovascular follow up.    HPI:  Cary Shah is a 84 y.o. female is here today for follow-up.  She has a wound on her foot that has been there since August and is not healing well.  She has a lot of edema in her legs as well.  She notes her toes are discolored.  She continues to get out of breath relatively easily.  She has been told she needs to get the fluid drained off of her legs.  She was instructed to take increased Lasix dose but cannot tolerate any more than 40 mg a day.  She was on hospice at 1 point but got off to do her foot surgery and is not plan to go back on.  She did not feel like it provided her with much benefit.  She has declined additional cardiac testing in the past and reports she continues to feel this way.      Previous Cardiac Testing and Procedures:  -Echo (11/14/2014) EF 45-50%, mild pulmonary hypertension, moderate LA enlargement, no significant valve dysfunction     Lab data:  -CBC (8/29/2023) WBC 7.9, hemoglobin 8.5, platelets 262  -Hemoglobin A1c (8/29/2023) 7.1%  -BMP (8/29/2023) creatinine 1.43, GFR 35, potassium 4.1, sodium 141  -Lipid panel (12/13/2024) HDL 31, triglycerides 85  -BMP (12/13/2024) creatinine 2.15, GFR 22, potassium 5.4, sodium 139    Patient Active Problem List   Diagnosis   • Chronic combined systolic and diastolic CHF (congestive heart failure)   • Coronary artery disease involving coronary bypass graft of native heart without angina pectoris   • Mixed hyperlipidemia   • Longstanding  persistent atrial fibrillation   • S/P CABG x 2   • Status post coronary artery stent placement   • History of vaginal bleeding   • Type 2 diabetes mellitus with complication, with long-term current use of insulin   • Class 1 obesity due to excess calories with serious comorbidity and body mass index (BMI) of 33.0 to 33.9 in adult   • Essential hypertension   • AICD (automatic cardioverter/defibrillator) present   • CKD (chronic kidney disease) stage 4, GFR 15-29 ml/min   • Cellulitis of lower extremity       Social History     Tobacco Use   • Smoking status: Some Days     Current packs/day: 0.50     Average packs/day: 0.5 packs/day for 65.1 years (32.5 ttl pk-yrs)     Types: Cigarettes     Start date: 1960     Passive exposure: Never   • Smokeless tobacco: Never   • Tobacco comments:     1 cigarette every now and then when gets a craving for one.  does not want to quit yet.   Vaping Use   • Vaping status: Never Used   Substance Use Topics   • Alcohol use: No   • Drug use: No       Family History   Problem Relation Age of Onset   • Coronary artery disease Mother    • Heart attack Mother    • Coronary artery disease Father    • Heart attack Father    • Heart disease Sister    • Heart attack Sister    • Coronary artery disease Sister    • Heart attack Brother    • Heart disease Brother    • Coronary artery disease Brother    • No Known Problems Maternal Grandmother    • No Known Problems Maternal Grandfather    • No Known Problems Paternal Grandmother    • No Known Problems Paternal Grandfather    • Heart disease Brother    • Heart disease Brother    • Heart attack Brother        The following portions of the patient's history were reviewed and updated as appropriate: allergies, current medications, past family history, past medical history, past social history, past surgical history, and problem list.      Current Outpatient Medications:   •  allopurinol (ZYLOPRIM) 100 MG tablet, Take 1 tablet by mouth Daily., Disp:  ", Rfl:   •  amLODIPine (NORVASC) 5 MG tablet, Take 1 tablet by mouth Daily., Disp: , Rfl:   •  aspirin  MG tablet, Take 1 tablet by mouth Daily., Disp: , Rfl:   •  calcitriol (ROCALTROL) 0.25 MCG capsule, TK ONE C PO  QD, Disp: , Rfl: 5  •  digoxin (LANOXIN) 125 MCG tablet, TAKE 1 TABLET BY MOUTH DAILY, Disp: 90 tablet, Rfl: 3  •  famotidine (PEPCID) 40 MG tablet, Take 1 tablet by mouth Daily., Disp: , Rfl:   •  furosemide (LASIX) 40 MG tablet, Take 1 tablet by mouth Daily., Disp: , Rfl:   •  glimepiride (AMARYL) 2 MG tablet, Take 1 tablet by mouth Every Morning Before Breakfast., Disp: , Rfl:   •  levothyroxine (SYNTHROID, LEVOTHROID) 200 MCG tablet, Take 1 tablet by mouth Daily., Disp: , Rfl:   •  metoprolol succinate XL (TOPROL-XL) 25 MG 24 hr tablet, Take 1 tablet by mouth Daily., Disp: 90 tablet, Rfl: 3  •  olmesartan (BENICAR) 40 MG tablet, TAKE 1 TABLET BY MOUTH DAILY, Disp: 30 tablet, Rfl: 11  •  spironolactone (ALDACTONE) 25 MG tablet, TAKE 1/2 TABLET BY MOUTH DAILY, Disp: 45 tablet, Rfl: 3  •  Camphor-Eucalyptus-Menthol (VICKS VAPORUB EX), Apply  topically. (Patient not taking: Reported on 1/20/2025), Disp: , Rfl:     Review of Systems   Constitutional: Negative for chills and fever.   Cardiovascular:  Positive for dyspnea on exertion and leg swelling. Negative for chest pain and paroxysmal nocturnal dyspnea.   Respiratory:  Positive for shortness of breath. Negative for cough.    Skin:  Positive for poor wound healing. Negative for rash.   Gastrointestinal:  Negative for abdominal pain and heartburn.   Neurological:  Positive for weakness. Negative for dizziness and numbness.       Objective  /48   Pulse 72   Ht 160 cm (63\")   Wt 83.9 kg (185 lb)   SpO2 98%   BMI 32.77 kg/m²   Constitutional:       Appearance: Well-developed. Frail. Chronically ill-appearing.   HENT:      Head: Normocephalic and atraumatic.   Pulmonary:      Effort: Pulmonary effort is normal.      Breath sounds: Normal " breath sounds.   Cardiovascular:      Normal rate. Irregular rhythm.   Edema:     Peripheral edema present.  Skin:     General: Skin is warm and dry.   Neurological:      Mental Status: Alert and oriented to person, place, and time.         ECG 12 Lead    Date/Time: 1/20/2025 4:19 PM  Performed by: Kan Fiore MD    Authorized by: Kan Fiore MD  Comparison: compared with previous ECG from 10/16/2024  Comparison to previous ECG: Ventricular pacing is now present  Rhythm: atrial fibrillation  Ectopy: unifocal PVCs  Rate: normal  Pacing: ventricular pacing          ICD-10-CM ICD-9-CM   1. Chronic combined systolic and diastolic CHF (congestive heart failure)  I50.42 428.42     428.0   2. Coronary artery disease involving coronary bypass graft of native heart without angina pectoris  I25.810 414.05   3. Longstanding persistent atrial fibrillation  I48.11 427.31   4. AICD (automatic cardioverter/defibrillator) present  Z95.810 V45.02   5. Essential hypertension  I10 401.9         Assessment/Plan:  1.  Chronic combined systolic and diastolic CHF: EF 45-50% on last echo in 2014.  Patient declines any further testing or procedures.  Came off hospice to have surgery, but does not want to get any further testing or procedures done.  Jardiance previously ordered but is no longer taking.  Continue metoprolol succinate, olmesartan, spironolactone, and furosemide as tolerated.       2.  Coronary artery disease: 2-vessel CABG in 2003.  No chest pain symptoms and declines any further testing.  Continue aspirin and metoprolol.      3.  Persistent atrial fibrillation: Anticoagulation stopped in the past secondary to bleeding.  Previously declined ADRIANA occlusion and is not willing to consider additional anticoagulation.  Continue aspirin.    4.  AICD in place: Device is nearing EOS.  She is not willing to have a generator change or any other procedures.  She understands the risks involved.     5.  Essential hypertension:  Systolic blood pressure is normal today with low diastolic pressure.

## 2025-04-18 ENCOUNTER — TELEPHONE (OUTPATIENT)
Dept: CARDIOLOGY | Facility: CLINIC | Age: 85
End: 2025-04-18

## 2025-04-18 NOTE — TELEPHONE ENCOUNTER
"  “Please be informed that patient has passed. Patient has been marked  in the system. The date of death is: 25\".    Caller: Eliane Villanueva    Relationship: Emergency Contact    Best call back number: [phone]    Did the patient have surgery within 30 days of their passing (Y/N): *N    "